# Patient Record
Sex: FEMALE | Race: WHITE | NOT HISPANIC OR LATINO | Employment: FULL TIME | URBAN - METROPOLITAN AREA
[De-identification: names, ages, dates, MRNs, and addresses within clinical notes are randomized per-mention and may not be internally consistent; named-entity substitution may affect disease eponyms.]

---

## 2017-01-16 ENCOUNTER — OFFICE VISIT (OUTPATIENT)
Dept: OBSTETRICS AND GYNECOLOGY | Facility: CLINIC | Age: 62
End: 2017-01-16

## 2017-01-16 VITALS
DIASTOLIC BLOOD PRESSURE: 64 MMHG | HEIGHT: 56 IN | BODY MASS INDEX: 36.94 KG/M2 | SYSTOLIC BLOOD PRESSURE: 122 MMHG | WEIGHT: 164.2 LBS

## 2017-01-16 DIAGNOSIS — Z12.4 SCREENING FOR MALIGNANT NEOPLASM OF CERVIX: ICD-10-CM

## 2017-01-16 DIAGNOSIS — Z13.9 SCREENING: ICD-10-CM

## 2017-01-16 DIAGNOSIS — Z01.419 ENCOUNTER FOR GYNECOLOGICAL EXAMINATION WITHOUT ABNORMAL FINDING: Primary | ICD-10-CM

## 2017-01-16 DIAGNOSIS — N95.2 POSTMENOPAUSAL ATROPHIC VAGINITIS: ICD-10-CM

## 2017-01-16 DIAGNOSIS — D25.9 UTERINE LEIOMYOMA, UNSPECIFIED LOCATION: ICD-10-CM

## 2017-01-16 LAB
BILIRUB BLD-MCNC: NEGATIVE MG/DL
CLARITY, POC: CLEAR
COLOR UR: YELLOW
GLUCOSE UR STRIP-MCNC: NEGATIVE MG/DL
KETONES UR QL: ABNORMAL
LEUKOCYTE EST, POC: NEGATIVE
NITRITE UR-MCNC: NEGATIVE MG/ML
PH UR: 5 [PH] (ref 5–8)
PROT UR STRIP-MCNC: NEGATIVE MG/DL
RBC # UR STRIP: ABNORMAL /UL
SP GR UR: 1.03 (ref 1–1.03)
UROBILINOGEN UR QL: NORMAL

## 2017-01-16 PROCEDURE — 99386 PREV VISIT NEW AGE 40-64: CPT | Performed by: OBSTETRICS & GYNECOLOGY

## 2017-01-16 PROCEDURE — 81002 URINALYSIS NONAUTO W/O SCOPE: CPT | Performed by: OBSTETRICS & GYNECOLOGY

## 2017-01-16 RX ORDER — METFORMIN HYDROCHLORIDE 500 MG/1
TABLET, EXTENDED RELEASE ORAL
Refills: 2 | COMMUNITY
Start: 2016-12-11

## 2017-01-16 RX ORDER — ANTIOX #8/OM3/DHA/EPA/LUT/ZEAX 250-2.5 MG
CAPSULE ORAL
COMMUNITY

## 2017-01-16 RX ORDER — RISEDRONATE SODIUM 150 MG/1
TABLET, FILM COATED ORAL
Qty: 3 TABLET | Refills: 4 | Status: SHIPPED | OUTPATIENT
Start: 2017-01-16 | End: 2018-01-12 | Stop reason: SDUPTHER

## 2017-01-16 NOTE — MR AVS SNAPSHOT
Trupti Davis   1/16/2017 1:15 PM   Office Visit    Dept Phone:  [phone]   Encounter #:  [id number]    Provider:  Angélica Weinstein MD   Department:  [facility] OB GYN                Your Full Care Plan              Today's Medication Changes          These changes are accurate as of: 1/16/17  2:28 PM.  If you have any questions, ask your nurse or doctor.               Medication(s)that have changed:     metFORMIN  MG 24 hr tablet   Commonly known as:  GLUCOPHAGE-XR   TK 1 T PO D WF FOR DIABETES   What changed:  Another medication with the same name was removed. Continue taking this medication, and follow the directions you see here.   Changed by:  Angélica Weinstein MD       risedronate 150 MG tablet   Commonly known as:  ACTONEL   TAKE 1 TABLET MONTHLY   What changed:  Another medication with the same name was removed. Continue taking this medication, and follow the directions you see here.   Changed by:  Bran Prado MD         Stop taking medication(s)listed here:     fexofenadine 180 MG tablet   Commonly known as:  ALLEGRA   Stopped by:  Angélica Weinstein MD                      Your Updated Medication List          This list is accurate as of: 1/16/17  2:28 PM.  Always use your most recent med list.                anastrozole 1 MG tablet   Commonly known as:  ARIMIDEX   Take 1 tablet by mouth daily.       aspirin 81 MG tablet       CITRACAL PO       hydrochlorothiazide 50 MG tablet   Commonly known as:  HYDRODIURIL       losartan 100 MG tablet   Commonly known as:  COZAAR       lovastatin 20 MG tablet   Commonly known as:  MEVACOR       meloxicam 15 MG tablet   Commonly known as:  MOBIC       metFORMIN  MG 24 hr tablet   Commonly known as:  GLUCOPHAGE-XR       * multivitamin-iron-minerals-folic acid chewable tablet       * PRESERVISION AREDS 2 capsule       risedronate 150 MG tablet   Commonly known as:  ACTONEL      TAKE 1 TABLET MONTHLY       * Notice:  This list has 2 medication(s) that are the same as other medications prescribed for you. Read the directions carefully, and ask your doctor or other care provider to review them with you.            We Performed the Following     IgP, Aptima HPV     POC Urinalysis Dipstick       You Were Diagnosed With        Codes Comments    Encounter for gynecological examination without abnormal finding    -  Primary ICD-10-CM: Z01.419  ICD-9-CM: V72.31     Postmenopausal atrophic vaginitis     ICD-10-CM: N95.2  ICD-9-CM: 627.3     Uterine leiomyoma, unspecified location     ICD-10-CM: D25.9  ICD-9-CM: 218.9     Screening     ICD-10-CM: Z13.9  ICD-9-CM: V82.9     Screening for malignant neoplasm of cervix     ICD-10-CM: Z12.4  ICD-9-CM: V76.2       Instructions     None    Patient Instructions History      Upcoming Appointments     Visit Type Date Time Department    NEW PATIENT 1/16/2017  1:15 PM [facility] BONE DENSITY AXIAL 1/31/2017  2:30 PM [facility] DEXA UCE    FOLLOW UP 1 UNIT 3/20/2017  1:00 PM [facility] [place]    LAB 3/20/2017 12:20 PM [facility] ONC CBC LAB [facility]      MyChart Signup     Our records indicate that you have an active [facility] account.    You can view your After Visit Summary by going to VFA and logging in with your [employer] username and password.  If you don't have a [employer] username and password but a parent or guardian has access to your record, the parent or guardian should login with their own [employer] username and password and access your record to view the After Visit Summary.    If you have questions, you can email [email or web address] or call [phone] to talk to our [employer] staff.  Remember, [employer] is NOT to be used for urgent needs.  For medical emergencies, dial 911.               Other Info from Your Visit           Your Appointments     Jan 31, 2017  2:30 PM EST   DEXA BONE DENSITY AXIAL with  "[facility] UCE DEXA 1   [facility] [place] BONE DENSITY ([place])    [address]  [facility] [address] [phone]           Bring current list of medications  Do not wear zippers and buttons  Bring outside films/report  Hold calcium for 24 hrs prior to procedure.            Mar 20, 2017 12:20 PM EDT   Lab with LAB CHAIR 2 [facility] ONC LAB (--)    [address]  [address]  [facility] [address] [phone]            Mar 20, 2017  1:00 PM EDT   FOLLOW UP with Bran Prado MD   [facility]LOOD DISORDERS [facility] CANCER ([facility])    [address]  [place] [address] [place] [id number] [phone]              Allergies     Augmentin [Amoxicillin-pot Clavulanate]      Cefdinir      Compazine [Prochlorperazine Edisylate]        Reason for Visit     Establish Care new pt    Gynecologic Exam last pap 4/2015, last mammo 9/2016 @ [facility], bone density 1/16 osteopenia, colonoscopy 1/2016      Vital Signs     Blood Pressure Height Weight Body Mass Index Smoking Status       122/64 56\" (142.2 cm) 164 lb 3.2 oz (74.5 kg) 36.81 kg/m2 Never Smoker       Problems and Diagnoses Noted     Encounter for routine gynecological examination    -  Primary    Postmenopausal vaginal dryness        Uterine fibroid        Screening        Screening for cervical cancer          Results     POC Urinalysis Dipstick      Component Value Standard Range & Units    Color Yellow Yellow, Straw, Dark Yellow, Eli    Clarity, UA Clear Clear    Glucose, UA Negative Negative, 1000 mg/dL (3+) mg/dL    Bilirubin Negative Negative    Ketones, UA Trace Negative    Specific Gravity  1.030 1.005 - 1.030    Blood, UA Trace Negative    pH, Urine 5.0 5.0 - 8.0    Protein, POC Negative Negative mg/dL    Urobilinogen, UA Normal Normal    Leukocytes Negative Negative    Nitrite, UA Negative Negative                    "

## 2017-01-16 NOTE — PROGRESS NOTES
[facility] Obstetrics and Gynecology    GYN ANNUAL EXAM:  Chief Complaint   Patient presents with   • Establish Care     new pt   • Gynecologic Exam     last pap 2015, last mammo 2016 @ [facility], bone density  osteopenia, colonoscopy 2016       Subjective   History of Present Illness    Trupti Davis is a 61 y.o. female who presents for annual exam.  Trupti has a personal history of left breast cancer in  she is status post chemotherapy and radiation and is currently on maintenance oral chemotherapy, Arimedex.  Recent mammogram in 2016 was benign.  She has a history of uterine fibroids.  She's had no postmenopausal bleeding.  She did not use postmenopausal hormone replacement therapy.  She has noted that her left breast is somewhat smaller than her right breast since her radiation therapy.  Her breasts are large and she had questions about breast reduction of the right breast.  We briefly discussed the surgery and postoperative course and in the difficulty she might have.    Obstetric History:  OB History      Para Term  AB TAB SAB Ectopic Multiple Living    3 2 2  1  1            Menstrual History:     No LMP recorded. Patient is postmenopausal.       Sexual History: NA         Family history of breast cancer: yes - P aunt  Family history of ovarian cancer: no  Family history of uterine cancer: no  Family History of cervical cancer: no  Family History of colon cancer/colon polyps: no  Regular self breast exam: yes  Current contraception:POST MENOPAUSAL  History of abnormal Pap smear: no  Received Gardasil immunization: N/A  LYNNETTE exposure in utero: no  History of abnormal mammogram: yes - Left breast cancer     The following portions of the patient's history were reviewed and updated as appropriate: allergies, current medications, past family history, past medical history, past social history, past surgical history and problem list.    Review of  "Systems    Pertinent items are noted in HPI.       Objective   Physical Exam    Visit Vitals   • /64   • Ht 56\" (142.2 cm)   • Wt 164 lb 3.2 oz (74.5 kg)   • BMI 36.81 kg/m2       General:   alert, appears stated age and cooperative   Heart: regular rate and rhythm, S1, S2 normal, no murmur, click, rub or gallop   Lungs: clear to auscultation bilaterally   Abdomen: soft, non-tender, without masses or organomegaly   Breast: inspection negative, no nipple discharge or bleeding, no masses or nodularity palpable and scar left breast   Vulva: Bartholin's, Urethra, [place]'s normal   Vagina: atrophic mucosa   Cervix: no bleeding following Pap, no cervical motion tenderness and no lesions   Uterus: midline, non-tender, mobile, Midplane, 8 weeks size irregular contour   Adnexa: normal adnexa and no mass, fullness, tenderness     Assessment/Plan   Trupti was seen today for establish care and gynecologic exam.    Diagnoses and all orders for this visit:    Encounter for gynecological examination without abnormal finding    Postmenopausal atrophic vaginitis    Uterine leiomyoma, unspecified location    Screening  -     POC Urinalysis Dipstick    Screening for malignant neoplasm of cervix  -     IgP, Aptima HPV        Thin prep Pap smear.  All questions answered.  Await pap smear results.  Follow up in 1 year.               Angélica Weinstein MD,  FACOG  "

## 2017-01-19 LAB
CYTOLOGIST CVX/VAG CYTO: NORMAL
CYTOLOGY CVX/VAG DOC THIN PREP: NORMAL
DX ICD CODE: NORMAL
HIV 1 & 2 AB SER-IMP: NORMAL
HPV I/H RISK 4 DNA CVX QL PROBE+SIG AMP: NEGATIVE
OTHER STN SPEC: NORMAL
PATH REPORT.FINAL DX SPEC: NORMAL
STAT OF ADQ CVX/VAG CYTO-IMP: NORMAL

## 2017-01-24 ENCOUNTER — TELEPHONE (OUTPATIENT)
Dept: OBSTETRICS AND GYNECOLOGY | Facility: CLINIC | Age: 62
End: 2017-01-24

## 2017-01-24 NOTE — TELEPHONE ENCOUNTER
SPOKE WITH JUSTIN AND TOLD HER PER DR FRANKLIN HER PAP SMEAR CAME BACK NEGATIVE. PT EXPRESSED UNDERSTANDING

## 2017-01-24 NOTE — TELEPHONE ENCOUNTER
----- Message from Angélica Weinstein MD sent at 1/23/2017  5:18 PM EST -----  Congratulations your pap testing is normal      Thank you  Angélica Weinstein MD

## 2017-01-31 ENCOUNTER — HOSPITAL ENCOUNTER (OUTPATIENT)
Dept: BONE DENSITY | Facility: HOSPITAL | Age: 62
Discharge: HOME | End: 2017-01-31
Attending: INTERNAL MEDICINE | Admitting: INTERNAL MEDICINE

## 2017-01-31 DIAGNOSIS — C50.412 MALIGNANT NEOPLASM OF UPPER-OUTER QUADRANT OF LEFT FEMALE BREAST (HCC): ICD-10-CM

## 2017-01-31 PROCEDURE — 77080 DXA BONE DENSITY AXIAL: CPT

## 2017-03-17 DIAGNOSIS — C50.412 MALIGNANT NEOPLASM OF UPPER-OUTER QUADRANT OF LEFT FEMALE BREAST (HCC): Primary | ICD-10-CM

## 2017-03-20 ENCOUNTER — LAB (OUTPATIENT)
Dept: OTHER | Facility: HOSPITAL | Age: 62
End: 2017-03-20

## 2017-03-20 ENCOUNTER — OFFICE VISIT (OUTPATIENT)
Dept: ONCOLOGY | Facility: CLINIC | Age: 62
End: 2017-03-20

## 2017-03-20 VITALS
BODY MASS INDEX: 36.67 KG/M2 | HEIGHT: 56 IN | SYSTOLIC BLOOD PRESSURE: 132 MMHG | DIASTOLIC BLOOD PRESSURE: 77 MMHG | RESPIRATION RATE: 14 BRPM | TEMPERATURE: 98.6 F | WEIGHT: 163 LBS | OXYGEN SATURATION: 96 % | HEART RATE: 63 BPM

## 2017-03-20 DIAGNOSIS — C50.412 MALIGNANT NEOPLASM OF UPPER-OUTER QUADRANT OF LEFT FEMALE BREAST (HCC): Primary | ICD-10-CM

## 2017-03-20 DIAGNOSIS — C50.412 MALIGNANT NEOPLASM OF UPPER-OUTER QUADRANT OF LEFT FEMALE BREAST (HCC): ICD-10-CM

## 2017-03-20 DIAGNOSIS — M85.80 OSTEOPENIA: ICD-10-CM

## 2017-03-20 LAB
ALBUMIN SERPL-MCNC: 4.6 G/DL (ref 3.5–5.2)
ALBUMIN/GLOB SERPL: 1.8 G/DL
ALP SERPL-CCNC: 53 U/L (ref 39–117)
ALT SERPL W P-5'-P-CCNC: 23 U/L (ref 1–33)
ANION GAP SERPL CALCULATED.3IONS-SCNC: 14.3 MMOL/L
AST SERPL-CCNC: 21 U/L (ref 1–32)
BASOPHILS # BLD AUTO: 0.05 10*3/MM3 (ref 0–0.2)
BASOPHILS NFR BLD AUTO: 1.1 % (ref 0–1.5)
BILIRUB SERPL-MCNC: 0.4 MG/DL (ref 0.1–1.2)
BUN BLD-MCNC: 19 MG/DL (ref 8–23)
BUN/CREAT SERPL: 26.8 (ref 7–25)
CALCIUM SPEC-SCNC: 9.5 MG/DL (ref 8.6–10.5)
CHLORIDE SERPL-SCNC: 100 MMOL/L (ref 98–107)
CO2 SERPL-SCNC: 28.7 MMOL/L (ref 22–29)
CREAT BLD-MCNC: 0.71 MG/DL (ref 0.57–1)
DEPRECATED RDW RBC AUTO: 40.6 FL (ref 37–54)
EOSINOPHIL # BLD AUTO: 0.13 10*3/MM3 (ref 0–0.7)
EOSINOPHIL NFR BLD AUTO: 2.9 % (ref 0.3–6.2)
ERYTHROCYTE [DISTWIDTH] IN BLOOD BY AUTOMATED COUNT: 12.8 % (ref 11.7–13)
GFR SERPL CREATININE-BSD FRML MDRD: 83 ML/MIN/1.73
GLOBULIN UR ELPH-MCNC: 2.5 GM/DL
GLUCOSE BLD-MCNC: 102 MG/DL (ref 65–99)
HCT VFR BLD AUTO: 38.6 % (ref 35.6–45.5)
HGB BLD-MCNC: 13.3 G/DL (ref 11.9–15.5)
HOLD SPECIMEN: NORMAL
IMM GRANULOCYTES # BLD: 0.01 10*3/MM3 (ref 0–0.03)
IMM GRANULOCYTES NFR BLD: 0.2 % (ref 0–0.5)
LYMPHOCYTES # BLD AUTO: 1.42 10*3/MM3 (ref 0.9–4.8)
LYMPHOCYTES NFR BLD AUTO: 32.1 % (ref 19.6–45.3)
MCH RBC QN AUTO: 30.1 PG (ref 26.9–32)
MCHC RBC AUTO-ENTMCNC: 34.5 G/DL (ref 32.4–36.3)
MCV RBC AUTO: 87.3 FL (ref 80.5–98.2)
MONOCYTES # BLD AUTO: 0.46 10*3/MM3 (ref 0.2–1.2)
MONOCYTES NFR BLD AUTO: 10.4 % (ref 5–12)
NEUTROPHILS # BLD AUTO: 2.36 10*3/MM3 (ref 1.9–8.1)
NEUTROPHILS NFR BLD AUTO: 53.3 % (ref 42.7–76)
NRBC BLD MANUAL-RTO: 0 /100 WBC (ref 0–0)
PLATELET # BLD AUTO: 223 10*3/MM3 (ref 140–500)
PMV BLD AUTO: 9.4 FL (ref 6–12)
POTASSIUM BLD-SCNC: 3.7 MMOL/L (ref 3.5–5.2)
PROT SERPL-MCNC: 7.1 G/DL (ref 6–8.5)
RBC # BLD AUTO: 4.42 10*6/MM3 (ref 3.9–5.2)
SODIUM BLD-SCNC: 143 MMOL/L (ref 136–145)
WBC NRBC COR # BLD: 4.43 10*3/MM3 (ref 4.5–10.7)

## 2017-03-20 PROCEDURE — 99214 OFFICE O/P EST MOD 30 MIN: CPT | Performed by: INTERNAL MEDICINE

## 2017-03-20 PROCEDURE — 85025 COMPLETE CBC W/AUTO DIFF WBC: CPT | Performed by: INTERNAL MEDICINE

## 2017-03-20 PROCEDURE — 36415 COLL VENOUS BLD VENIPUNCTURE: CPT

## 2017-03-20 PROCEDURE — 80053 COMPREHEN METABOLIC PANEL: CPT | Performed by: INTERNAL MEDICINE
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
## 2017-03-21 DIAGNOSIS — C50.412 MALIGNANT NEOPLASM OF UPPER-OUTER QUADRANT OF LEFT FEMALE BREAST (HCC): Primary | ICD-10-CM

## 2017-03-21 DIAGNOSIS — M85.80 OSTEOPENIA: ICD-10-CM

## 2017-04-12 ENCOUNTER — TRANSCRIBE ORDERS (OUTPATIENT)
Dept: ADMINISTRATIVE | Facility: HOSPITAL | Age: 62
End: 2017-04-12

## 2017-04-12 DIAGNOSIS — C50.412 MALIGNANT NEOPLASM OF UPPER-OUTER QUADRANT OF LEFT FEMALE BREAST (HCC): Primary | ICD-10-CM

## 2017-05-15 RX ORDER — ANASTROZOLE 1 MG/1
TABLET ORAL
Qty: 90 TABLET | Refills: 3 | Status: SHIPPED | OUTPATIENT
Start: 2017-05-15 | End: 2018-04-19 | Stop reason: SDUPTHER

## 2017-09-15 ENCOUNTER — HOSPITAL ENCOUNTER (OUTPATIENT)
Dept: MAMMOGRAPHY | Facility: HOSPITAL | Age: 62
Discharge: HOME | End: 2017-09-15
Attending: INTERNAL MEDICINE | Admitting: INTERNAL MEDICINE

## 2017-09-15 DIAGNOSIS — C50.412 MALIGNANT NEOPLASM OF UPPER-OUTER QUADRANT OF LEFT FEMALE BREAST (HCC): ICD-10-CM

## 2017-09-15 PROCEDURE — 77063 BREAST TOMOSYNTHESIS BI: CPT

## 2017-09-15 PROCEDURE — G0202 SCR MAMMO BI INCL CAD: HCPCS

## 2017-09-18 ENCOUNTER — LAB (OUTPATIENT)
Dept: OTHER | Facility: HOSPITAL | Age: 62
End: 2017-09-18

## 2017-09-18 ENCOUNTER — OFFICE VISIT (OUTPATIENT)
Dept: ONCOLOGY | Facility: CLINIC | Age: 62
End: 2017-09-18

## 2017-09-18 VITALS
RESPIRATION RATE: 18 BRPM | OXYGEN SATURATION: 98 % | BODY MASS INDEX: 36.22 KG/M2 | WEIGHT: 161 LBS | SYSTOLIC BLOOD PRESSURE: 126 MMHG | HEART RATE: 65 BPM | TEMPERATURE: 98.1 F | HEIGHT: 56 IN | DIASTOLIC BLOOD PRESSURE: 77 MMHG

## 2017-09-18 DIAGNOSIS — C50.412 MALIGNANT NEOPLASM OF UPPER-OUTER QUADRANT OF LEFT FEMALE BREAST (HCC): ICD-10-CM

## 2017-09-18 DIAGNOSIS — M85.80 OSTEOPENIA: ICD-10-CM

## 2017-09-18 DIAGNOSIS — C50.412 MALIGNANT NEOPLASM OF UPPER-OUTER QUADRANT OF LEFT FEMALE BREAST (HCC): Primary | ICD-10-CM

## 2017-09-18 LAB
ALBUMIN SERPL-MCNC: 4.8 G/DL (ref 3.5–5.2)
ALBUMIN/GLOB SERPL: 1.9 G/DL
ALP SERPL-CCNC: 58 U/L (ref 39–117)
ALT SERPL W P-5'-P-CCNC: 27 U/L (ref 1–33)
ANION GAP SERPL CALCULATED.3IONS-SCNC: 13.7 MMOL/L
AST SERPL-CCNC: 23 U/L (ref 1–32)
BASOPHILS # BLD AUTO: 0.05 10*3/MM3 (ref 0–0.2)
BASOPHILS NFR BLD AUTO: 1 % (ref 0–1.5)
BILIRUB SERPL-MCNC: 0.3 MG/DL (ref 0.1–1.2)
BUN BLD-MCNC: 19 MG/DL (ref 8–23)
BUN/CREAT SERPL: 29.7 (ref 7–25)
CALCIUM SPEC-SCNC: 10.2 MG/DL (ref 8.6–10.5)
CHLORIDE SERPL-SCNC: 98 MMOL/L (ref 98–107)
CO2 SERPL-SCNC: 29.3 MMOL/L (ref 22–29)
CREAT BLD-MCNC: 0.64 MG/DL (ref 0.57–1)
DEPRECATED RDW RBC AUTO: 40.1 FL (ref 37–54)
EOSINOPHIL # BLD AUTO: 0.14 10*3/MM3 (ref 0–0.7)
EOSINOPHIL NFR BLD AUTO: 2.7 % (ref 0.3–6.2)
ERYTHROCYTE [DISTWIDTH] IN BLOOD BY AUTOMATED COUNT: 12.6 % (ref 11.7–13)
GFR SERPL CREATININE-BSD FRML MDRD: 94 ML/MIN/1.73
GLOBULIN UR ELPH-MCNC: 2.5 GM/DL
GLUCOSE BLD-MCNC: 109 MG/DL (ref 65–99)
HCT VFR BLD AUTO: 39.2 % (ref 35.6–45.5)
HGB BLD-MCNC: 13.6 G/DL (ref 11.9–15.5)
IMM GRANULOCYTES # BLD: 0.02 10*3/MM3 (ref 0–0.03)
IMM GRANULOCYTES NFR BLD: 0.4 % (ref 0–0.5)
LYMPHOCYTES # BLD AUTO: 1.31 10*3/MM3 (ref 0.9–4.8)
LYMPHOCYTES NFR BLD AUTO: 24.9 % (ref 19.6–45.3)
MCH RBC QN AUTO: 30.4 PG (ref 26.9–32)
MCHC RBC AUTO-ENTMCNC: 34.7 G/DL (ref 32.4–36.3)
MCV RBC AUTO: 87.5 FL (ref 80.5–98.2)
MONOCYTES # BLD AUTO: 0.47 10*3/MM3 (ref 0.2–1.2)
MONOCYTES NFR BLD AUTO: 8.9 % (ref 5–12)
NEUTROPHILS # BLD AUTO: 3.27 10*3/MM3 (ref 1.9–8.1)
NEUTROPHILS NFR BLD AUTO: 62.1 % (ref 42.7–76)
NRBC BLD MANUAL-RTO: 0 /100 WBC (ref 0–0)
PLATELET # BLD AUTO: 243 10*3/MM3 (ref 140–500)
PMV BLD AUTO: 9.7 FL (ref 6–12)
POTASSIUM BLD-SCNC: 4.4 MMOL/L (ref 3.5–5.2)
PROT SERPL-MCNC: 7.3 G/DL (ref 6–8.5)
RBC # BLD AUTO: 4.48 10*6/MM3 (ref 3.9–5.2)
SODIUM BLD-SCNC: 141 MMOL/L (ref 136–145)
WBC NRBC COR # BLD: 5.26 10*3/MM3 (ref 4.5–10.7)

## 2017-09-18 PROCEDURE — 99213 OFFICE O/P EST LOW 20 MIN: CPT | Performed by: INTERNAL MEDICINE

## 2017-09-18 PROCEDURE — 80053 COMPREHEN METABOLIC PANEL: CPT | Performed by: INTERNAL MEDICINE

## 2017-09-18 PROCEDURE — 36415 COLL VENOUS BLD VENIPUNCTURE: CPT

## 2017-09-18 PROCEDURE — 85025 COMPLETE CBC W/AUTO DIFF WBC: CPT | Performed by: INTERNAL MEDICINE

## 2017-09-18 RX ORDER — PHENAZOPYRIDINE HYDROCHLORIDE 100 MG/1
TABLET, FILM COATED ORAL
Refills: 0 | COMMUNITY
Start: 2017-09-06 | End: 2017-09-18

## 2017-09-18 RX ORDER — NITROFURANTOIN 25; 75 MG/1; MG/1
CAPSULE ORAL
Refills: 0 | COMMUNITY
Start: 2017-09-06 | End: 2017-09-18

## 2017-09-18 NOTE — PROGRESS NOTES
Subjective      REASONS FOR FOLLOWUP:   1. T1cN0, ER positive, IL negative, HER/2 negative breast cancer, high intermediate OncoType score(29), receiving Taxotere/Cytoxan x4 followed by aromatase inhibitor.   2. Followup visit for right arm (antecubital area, Adriamycin/Cytoxan) infiltration after receiving Taxotere on 02/09/2012.   3. Arimidex started on 04/09/2012.   4. Carpal tunnel syndrome.   5. Persistent mass in right breast at 10 o'clock - evaluation by Dr. Fitzgerald planned.   6. Persistent mass of right breast at 10 o'clock. Dr. Fitzgerald feels this is benign.   7. Worsening bone density, Actonel resumed in 03/2015.       History of Present Illness  patient is 60-year-old lady with a T1 CN 0 ER/IL positive left breast cancer diagnosed in 2012 and treated with 4 cycles of Cytoxan and Taxotere followed by radiation and Arimidex now for 5 years.  She comes in for routine follow-up and is doing very well overall.  Because of her high risk Oncotype we have decided to continue her therapy for 10 years with Arimidex and her bone density is stable with actual improvement in the lumbar spine in January of this year.  She herself is also keen on continuing treatment at this point and is not having undue side effects.  She remains on Actonel    PAST MEDICAL HISTORY: Significant for mild hypertension, hypercholesterolemia, degenerative arthritis in her neck, and carpal tunnel. She has no diabetes, no kidney problems, no ulcers, no heart attacks or strokes, no DVT, no asthma, no emphysema, no prior problems with hepat itis or anemia.     PAST SURGICAL HISTORY: D and C 1978, 2 childbirths, gallbladder surgery 2007, toenails removed. Previous biopsy on left breast for an abnormality on mammography in 2008 which showed no abnormalities and non-atypical ductal hyperplasia.     OB/GYN HISTORY: G2, P2, AB1 (miscarriage). Menarche age 9. Menopause age 51. She has not been on any hormonal replacement. Age 23 with her first  childbirth and did not breast feed.      ONCOLOGIC HISTORY: The patient was noted on routine mammography this year to have a change in the left breast which was not seen in 2011. The findings revealed a 9-10 mm abnormality in the left breast. Ultrasound showed this to be hypoechoic and slight l y irregular, measuring 1.3 cm in greatest dimension. A new biopsy was done on 10/24/11 which showed an invasive ductal carcinoma focally with lobular features, well-differentiated grade 1, ER positive, CA negative, Her-2 negative. The patient then was r e ferred to Dr. Jose R Crystal and had an MRI of both breasts. The right was normal. The left breast showed post biopsy hematoma cavity. Along with the cavity there was a nodular irregular focus of enhancement measuring 1.2 x 0.9 cm. The patient then under w ent lumpectomy sentinel biopsy on 11/21/11 with the findings of a residual 1 cm invasive ductal carcinoma grade 1 out of 3, with no lobular features noted. Margins were widely clear and 2 sentinel nodes were negative for metastasis. The patient is refer red her for decision about further adjuvant treatment.   OncoType DX shows a score of 29 which gives her 20% chance of distant recurrence of 10 years. We discussed options and I told her based on the fact that the Nayla-RX study had actually used a cutof f of 25 for intermediate risk, I felt that she would definitely qualify for some additional adjuvant treatment in the form of chemotherapy with four doses of Cytoxan/Taxotere and she is agreeable. She has had chemo education and we are going to try to do this without a port and she wants to start on Thursday so she can get done before March when she has a cruise planned. She did have a cardiac echo done which showed EF of 55-60% and no other abnormalities.   Patient seen 2/9/12 for cycle 3 of Cytoxan/Taxotere with Neulasta support doing well. Plans to go to [place] for one week after her third cycle before her radiation.    The patient was seen on 12 for her last dose of chemo. Her extravasation site is much improved. We plan to get a PICC line p laced above the extravasation site for her last chemo and pull it after treatment, and she is planning to go to [place] in about 2 weeks and we will check her blood counts before she leaves.   The patient returned from [place] in late March, started radiation in early April and Arimidex to start 4/10/12. Bone density will be checked again in the summer of .   The patient was seen on 10/22/12 doing well. Her hands are bothering her and she is scheduled to have carpal tunnel surgery.   The patient was seen 13 doing well. Carpal tunnels were injected with good relief. Due for followup mammogram in April.   Bilateral carpal tunnel surgery done to  with good results. Arimidex continued. Mammogram 2013 with ultrasound negative.   SOCIAL HISTORY: . Works as an . Does not smoke. Rare alcohol. No drug history or risk factors for HIV.     FAMILY HISTORY: Father  at 85 of congestive heart failure. Mother is alive at 83. No siblings. Two sons, who are healthy.   at age 38 of ruptured aneurysm. No FH of breast, ovarian, or colon cancer.     Review of Systems   Musculoskeletal: Positive for arthralgias and back pain.      A comprehensive 14 point review of systems was performed and was negative except as mentioned.    Current Outpatient Prescriptions on File Prior to Visit   Medication Sig Dispense Refill   • anastrozole (ARIMIDEX) 1 MG tablet TAKE 1 TABLET DAILY 90 tablet 3   • aspirin 81 MG tablet Take 81 mg by mouth daily.     • Calcium Citrate (CITRACAL PO) Take  by mouth.     • hydrochlorothiazide (HYDRODIURIL) 50 MG tablet Take 50 mg by mouth daily.     • losartan (COZAAR) 100 MG tablet Take 100 mg by mouth daily.     • lovastatin (MEVACOR) 20 MG tablet Take 20 mg by mouth every night.     • meloxicam (MOBIC) 15 MG tablet Take 15 mg by  "mouth As Needed.     • metFORMIN XR (GLUCOPHAGE-XR) 500 MG 24 hr tablet TK 1 T PO D WF FOR DIABETES  2   • Multiple Vitamins-Minerals (PRESERVISION AREDS 2) capsule Take  by mouth.     • multivitamin-iron-minerals-folic acid (CENTRUM) chewable tablet Chew 1 tablet daily.     • risedronate (ACTONEL) 150 MG tablet TAKE 1 TABLET MONTHLY 3 tablet 4     No current facility-administered medications on file prior to visit.          ALLERGIES:    Allergies   Allergen Reactions   • Augmentin [Amoxicillin-Pot Clavulanate]    • Cefdinir    • Compazine [Prochlorperazine Edisylate]        Objective      Vitals:    09/18/17 1302   BP: 126/77   Pulse: 65   Resp: 18   Temp: 98.1 °F (36.7 °C)   TempSrc: Oral   SpO2: 98%   Weight: 161 lb (73 kg)   Height: 55.91\" (142 cm)  Comment: new ht   PainSc: 0-No pain     Current Status 9/18/2017   ECOG score 0       Physical Exam    GENERAL:  Well-developed, well-nourished in no acute distress.   SKIN:  Warm, dry without rashes, purpura or petechiae.  HEAD:  Normocephalic.  EYES:  Pupils equal, round and reactive to light.  EOMs intact.  Conjunctivae normal.  EARS:  Hearing intact.  NOSE:  Septum midline.  No excoriations or nasal discharge.  MOUTH:  Tongue is well-papillated; no stomatitis or ulcers.  Lips normal.  THROAT:  Oropharynx without lesions or exudates.  NECK:  Supple with good range of motion; no thyromegaly or masses, no JVD.  LYMPHATICS:  No cervical, supraclavicular, axillary or inguinal adenopathy.  CHEST:  Lungs clear to percussion and auscultation. Good airflow.  BREASTS: Right breast shows the persistent nodularity the 10 o'clock position which is stable                     Left breast is benign with a lumpectomy scar in the upper inner quadrant  CARDIAC:  Regular rate and rhythm without murmurs, rubs or gallops. Normal S1,S2.  ABDOMEN:  Soft, nontender with no organomegaly or masses.  EXTREMITIES:  No clubbing, cyanosis or edema.  NEUROLOGICAL:  Cranial Nerves II-XII " grossly intact.  No focal neurological deficits.  PSYCHIATRIC:  Normal affect and mood.  No percussion tenderness over the spine      RECENT LABS:  Hematology WBC   Date Value Ref Range Status   09/18/2017 5.26 4.50 - 10.70 10*3/mm3 Final     RBC   Date Value Ref Range Status   09/18/2017 4.48 3.90 - 5.20 10*6/mm3 Final     Hemoglobin   Date Value Ref Range Status   09/18/2017 13.6 11.9 - 15.5 g/dL Final     Hematocrit   Date Value Ref Range Status   09/18/2017 39.2 35.6 - 45.5 % Final     Platelets   Date Value Ref Range Status   09/18/2017 243 140 - 500 10*3/mm3 Final          FINDINGS: Average lumbar bone mineral density 0.87 g/cm2 correlating to  a T value of -1.6 and a Z value of 0.      Left femoral neck bone mineral density 0.75 g/cm2 correlating to a T  value of -0.9 and a Z score of 0.5. Right femoral neck bone mineral  density 0.74 g/cm2 correlating to a T value -1.0 and a Z value of 0.4.      CONCLUSION: Osteopenia, increased risk for fracture.  Better than 2016      This report was finalized on 2/1/2017     Assessment/Plan   1.T1 CN 0 ER/MN positive left breast cancer on Arimidex for 5 years with good tolerance Oncotype score of 29--Arimidex continued beyond 5 years  2.  Mild of moderate osteopenia improving in the spine on Actonel  3.  Right breast nodularity stable  4.  Low back pain for 4 months questionable etiology-negative bone scan symptoms resolved as of 3/17    Plan   1. return in 1 year for follow-up and continue Arimidex for now              9/18/2017      CC:

## 2018-01-12 RX ORDER — RISEDRONATE SODIUM 150 MG/1
TABLET, FILM COATED ORAL
Qty: 3 TABLET | Refills: 3 | Status: SHIPPED | OUTPATIENT
Start: 2018-01-12 | End: 2019-11-25 | Stop reason: SDUPTHER

## 2018-04-19 RX ORDER — ANASTROZOLE 1 MG/1
1 TABLET ORAL DAILY
Qty: 90 TABLET | Refills: 3 | Status: SHIPPED | OUTPATIENT
Start: 2018-04-19 | End: 2018-05-02 | Stop reason: SDUPTHER

## 2018-05-02 RX ORDER — ANASTROZOLE 1 MG/1
1 TABLET ORAL DAILY
Qty: 90 TABLET | Refills: 3 | Status: SHIPPED | OUTPATIENT
Start: 2018-05-02 | End: 2019-03-21 | Stop reason: SDUPTHER

## 2018-05-02 NOTE — TELEPHONE ENCOUNTER
[facility], should be sent to [facility]. New prescription sent to [facility]

## 2018-08-10 DIAGNOSIS — Z17.0 MALIGNANT NEOPLASM OF UPPER-OUTER QUADRANT OF LEFT BREAST IN FEMALE, ESTROGEN RECEPTOR POSITIVE (HCC): Primary | ICD-10-CM

## 2018-08-10 DIAGNOSIS — C50.412 MALIGNANT NEOPLASM OF UPPER-OUTER QUADRANT OF LEFT BREAST IN FEMALE, ESTROGEN RECEPTOR POSITIVE (HCC): Primary | ICD-10-CM

## 2018-08-10 NOTE — PROGRESS NOTES
Continue Coumadin at the present dose.      What to Know When Taking Warfarin  Warfarin (brand name Coumadin) is medicine that controls how your blood clots. It is used to help prevent blood clots that may cause serious health problems. These problems include heart attack (myocardial infarction), stroke, a blockage in an artery or vein (thrombus), or a blood clot that travels to the lungs (pulmonary embolism).    Before you start taking this medicine, be sure your doctor knows if you have any of these conditions:  · Stomach ulcer now or in the past  · Vomited blood or had bloody stools (black or red color)  · Aneurysm, pericarditis, or pericardial effusion  · Blood disorder  · Recent surgery, stroke, mini-stroke, or spinal puncture  · Kidney or liver disease, uncontrolled high blood pressure, diabetes, vasculitis, heart failure, lupus or other collagen-vascular disease, or high cholesterol  · You are pregnant or breastfeeding  · You are younger than 18 years old  · Recent or planned dental procedure  Although warfarin reduces the risk for blood clots, it increases your risk of bleeding. Because of this, you must take the medicine exactly as you are told by your healthcare provider.   You will have blood tests often to check the level of warfarin in your blood. It is important to have just the right amount to balance preventing blood clots and causing excessive bleeding. If the level is too low, you are at risk for developing a blood clot. If it's too high, you are at risk for bleeding. Make sure you keep all scheduled appointments for blood testing. If you dont, you will be at risk for bleeding problems. You also need to protect yourself from injury that may cause bleeding such as a fall or hitting your head.  Follow these tips  · Take this medicine at the same time each day. Take it with a full glass of water, with or without food.  · Make sure you know what to do if you miss a dose. If you are not sure what to  Per message from Eli @ appointment desk per verbal order Dr Prado placed order for yearly mammogram   do, call your healthcare provider or pharmacist. Do not take more warfarin than you were told to.  · Be sure to tell all of your providers including your dentist that you take warfarin. If you will be taking warfarin for quite a while, carry an ID card or get a Medic-Alert bracelet. This will alert medical staff in case you arent able to do so yourself. Also carry with you the name and number of the person to contact in case of an emergency.  · Keep your appointments for regular blood tests to measure the effects of warfarin. Your healthcare provider may need to change your dose based on the results of your blood test. Follow your doctors advice exactly about how to take this medicine.  · Do not stop taking the medicine without talking with your doctor.     Monitoring your PT/INR blood levels  You will need to have a blood test called a PT/INR regularly. PT stands for pro thrombin. INR stands for international normalized ratio. The PT/INR blood test is done to make sure you are getting the right dose of this medicine. The PT/INR test tells your healthcare provider how your blood is clotting. Prothrombin time, commonly referred to as pro time or PT, measures the time it takes for blood to clot. International normalized ratio or INR is a way to compare results of the PT tests done at different labs.  ·  remember to tell your doctor as soon as you get your lab results.        1. Prevent injuries and bleeding:  ¨ Do not go barefoot. Always wear shoes.  ¨ Do not trim corns or calluses yourself.  ¨ Use an electric razor to shave instead of a manual one.  ¨ Use a soft-bristled toothbrush and waxed dental floss.  2. Some medicines can affect your blood clotting. Always talk with your healthcare provider before stopping, starting, or changing any prescription or over-the-counter (OTC) medicines. This includes herbal medicines and vitamins. Talk with your healthcare provider about the following medicines:  ¨ Some  antibiotics. This is critical because some common antibiotics can cause severe bleeding if you take them along with warfarin. These antibiotics include ciprofloxacin and trimethoprim-sulfamethoxazole.   ¨ Some heart medicines  ¨ Cimetidine  ¨ Aspirin or other anti-inflammatory drugs, such as ibuprofen, naproxen, ketoprofen, or other arthritis medicines  ¨ Some drugs for depression, cancer, HIV (protease inhibitors), diabetes, seizures, gout, high cholesterol, or thyroid replacement  ¨ Vitamins containing Vitamin K  ¨ Herbal products such as ginkgo, Q10, garlic, or Janet's wort  3. Avoid major changes in your diet. Consuming high amounts of foods containing vitamin K can reduce the effectiveness of your warfarin.      Keep your diet steady  Keep your diet pretty much the same each day. Thats because many foods contain vitamin K. Vitamin K helps your blood clot. So eating disproportionately high amounts of foods that contain vitamin K can affect the way warfarin works. You dont need to avoid foods that have vitamin K. But you do need to keep the amount of them you eat steady (about the same day to day). If you change your diet for any reason, such as for illness or to lose weight, be sure to tell your doctor.  · Examples of foods containing vitamin K are asparagus, avocado, broccoli, cabbage, kale, spinach, and some other leafy green vegetables. Oils such as soybean, canola, and olive oils also contain vitamin K.  · Other food products can affect the way warfarin works in your body:  ¨ Food products that may affect blood clotting include cranberries and cranberry juice, fish oil supplements, garlic, momo, licorice, and turmeric.  ¨ Herbs used in herbal teas or supplements can also affect blood clotting. Keep the amount of herbal teas and supplements you use steady.  ¨ Alcohol can increase the effect of warfarin in your body.  Talk with your healthcare provider if you have concerns about these or other food  products and their effects on warfarin.  When to call your healthcare provider  Warfarin increases your risk of bleeding. Call your healthcare provider right away before you take your next dose of warfarin if you have any of these problems:  · Bleeding that doesnt stop in 10 minutes. This might be from a bloody nose or a cut, for example.  · A heavier-than-normal period or bleeding between periods  · Coughing or throwing up blood or something that looks like coffee grounds  · Nausea, bloating, or diarrhea  · Bleeding hemorrhoids  · Dark red or brown urine  · Red or black tarry stools  · Red or black-and-blue marks on the skin that get larger  · A fever or an illness that gets worse  · Dizziness, headache, weakness, or fatigue  · Chest pain or trouble breathing  · A serious fall or a blow to the head  · Swelling or pain after an injury or at an injection site  · Bleeding gums after brushing your teeth  What to watch for  If you have any of the following allergic reactions, call your doctor right away or go to the hospital.  · Rash  · Itching  · Swelling  · Trouble swallowing or breathing  [NOTE: This information topic may not include all directions, precautions, medical conditions, drug/food interactions, and warnings for this drug. Check with your doctor, nurse, or pharmacist for any questions that you may have.]  Date Last Reviewed: 6/1/2016  © 0429-8215 [employer]. [address] [place] [address], [place] [address]. All rights reserved. This information is not intended as a substitute for professional medical care. Always follow your healthcare professional's instructions.

## 2018-09-17 ENCOUNTER — HOSPITAL ENCOUNTER (OUTPATIENT)
Dept: MAMMOGRAPHY | Facility: HOSPITAL | Age: 63
Discharge: HOME | End: 2018-09-17
Attending: INTERNAL MEDICINE | Admitting: INTERNAL MEDICINE

## 2018-09-17 DIAGNOSIS — C50.412 MALIGNANT NEOPLASM OF UPPER-OUTER QUADRANT OF LEFT BREAST IN FEMALE, ESTROGEN RECEPTOR POSITIVE (HCC): ICD-10-CM

## 2018-09-17 DIAGNOSIS — Z17.0 MALIGNANT NEOPLASM OF UPPER-OUTER QUADRANT OF LEFT BREAST IN FEMALE, ESTROGEN RECEPTOR POSITIVE (HCC): ICD-10-CM

## 2018-09-17 PROCEDURE — 77067 SCR MAMMO BI INCL CAD: CPT

## 2018-09-17 PROCEDURE — 77063 BREAST TOMOSYNTHESIS BI: CPT

## 2018-10-08 ENCOUNTER — APPOINTMENT (OUTPATIENT)
Dept: OTHER | Facility: HOSPITAL | Age: 63
End: 2018-10-08

## 2018-10-08 ENCOUNTER — OFFICE VISIT (OUTPATIENT)
Dept: ONCOLOGY | Facility: CLINIC | Age: 63
End: 2018-10-08

## 2018-10-08 VITALS
DIASTOLIC BLOOD PRESSURE: 72 MMHG | OXYGEN SATURATION: 95 % | SYSTOLIC BLOOD PRESSURE: 131 MMHG | TEMPERATURE: 98.7 F | HEART RATE: 65 BPM | HEIGHT: 56 IN | BODY MASS INDEX: 34.42 KG/M2 | RESPIRATION RATE: 12 BRPM | WEIGHT: 153 LBS

## 2018-10-08 DIAGNOSIS — C50.412 MALIGNANT NEOPLASM OF UPPER-OUTER QUADRANT OF LEFT BREAST IN FEMALE, ESTROGEN RECEPTOR POSITIVE (HCC): ICD-10-CM

## 2018-10-08 DIAGNOSIS — Z17.0 MALIGNANT NEOPLASM OF UPPER-OUTER QUADRANT OF LEFT BREAST IN FEMALE, ESTROGEN RECEPTOR POSITIVE (HCC): ICD-10-CM

## 2018-10-08 DIAGNOSIS — M85.859 OSTEOPENIA OF HIP, UNSPECIFIED LATERALITY: Primary | ICD-10-CM

## 2018-10-08 DIAGNOSIS — C50.412 MALIGNANT NEOPLASM OF UPPER-OUTER QUADRANT OF LEFT FEMALE BREAST, UNSPECIFIED ESTROGEN RECEPTOR STATUS (HCC): Primary | ICD-10-CM

## 2018-10-08 LAB
ALBUMIN SERPL-MCNC: 4.6 G/DL (ref 3.5–5.2)
ALBUMIN/GLOB SERPL: 1.6 G/DL
ALP SERPL-CCNC: 53 U/L (ref 39–117)
ALT SERPL W P-5'-P-CCNC: 19 U/L (ref 1–33)
ANION GAP SERPL CALCULATED.3IONS-SCNC: 14 MMOL/L
AST SERPL-CCNC: 17 U/L (ref 1–32)
BASOPHILS # BLD AUTO: 0.05 10*3/MM3 (ref 0–0.2)
BASOPHILS NFR BLD AUTO: 1.2 % (ref 0–1.5)
BILIRUB SERPL-MCNC: 0.2 MG/DL (ref 0.1–1.2)
BUN BLD-MCNC: 22 MG/DL (ref 8–23)
BUN/CREAT SERPL: 34.4 (ref 7–25)
CALCIUM SPEC-SCNC: 9.8 MG/DL (ref 8.6–10.5)
CHLORIDE SERPL-SCNC: 100 MMOL/L (ref 98–107)
CO2 SERPL-SCNC: 28 MMOL/L (ref 22–29)
CREAT BLD-MCNC: 0.64 MG/DL (ref 0.57–1)
DEPRECATED RDW RBC AUTO: 41.2 FL (ref 37–54)
EOSINOPHIL # BLD AUTO: 0.14 10*3/MM3 (ref 0–0.7)
EOSINOPHIL NFR BLD AUTO: 3.3 % (ref 0.3–6.2)
ERYTHROCYTE [DISTWIDTH] IN BLOOD BY AUTOMATED COUNT: 12.8 % (ref 11.7–13)
GFR SERPL CREATININE-BSD FRML MDRD: 93 ML/MIN/1.73
GLOBULIN UR ELPH-MCNC: 2.8 GM/DL
GLUCOSE BLD-MCNC: 119 MG/DL (ref 65–99)
HCT VFR BLD AUTO: 38.7 % (ref 35.6–45.5)
HGB BLD-MCNC: 13.3 G/DL (ref 11.9–15.5)
IMM GRANULOCYTES # BLD: 0.01 10*3/MM3 (ref 0–0.03)
IMM GRANULOCYTES NFR BLD: 0.2 % (ref 0–0.5)
LYMPHOCYTES # BLD AUTO: 1.29 10*3/MM3 (ref 0.9–4.8)
LYMPHOCYTES NFR BLD AUTO: 30 % (ref 19.6–45.3)
MCH RBC QN AUTO: 30.3 PG (ref 26.9–32)
MCHC RBC AUTO-ENTMCNC: 34.4 G/DL (ref 32.4–36.3)
MCV RBC AUTO: 88.2 FL (ref 80.5–98.2)
MONOCYTES # BLD AUTO: 0.39 10*3/MM3 (ref 0.2–1.2)
MONOCYTES NFR BLD AUTO: 9.1 % (ref 5–12)
NEUTROPHILS # BLD AUTO: 2.42 10*3/MM3 (ref 1.9–8.1)
NEUTROPHILS NFR BLD AUTO: 56.2 % (ref 42.7–76)
NRBC BLD MANUAL-RTO: 0 /100 WBC (ref 0–0)
PLATELET # BLD AUTO: 224 10*3/MM3 (ref 140–500)
PMV BLD AUTO: 9.5 FL (ref 6–12)
POTASSIUM BLD-SCNC: 3.8 MMOL/L (ref 3.5–5.2)
PROT SERPL-MCNC: 7.4 G/DL (ref 6–8.5)
RBC # BLD AUTO: 4.39 10*6/MM3 (ref 3.9–5.2)
SODIUM BLD-SCNC: 142 MMOL/L (ref 136–145)
WBC NRBC COR # BLD: 4.3 10*3/MM3 (ref 4.5–10.7)

## 2018-10-08 PROCEDURE — 99213 OFFICE O/P EST LOW 20 MIN: CPT | Performed by: INTERNAL MEDICINE

## 2018-10-08 PROCEDURE — 36415 COLL VENOUS BLD VENIPUNCTURE: CPT

## 2018-10-08 PROCEDURE — 85025 COMPLETE CBC W/AUTO DIFF WBC: CPT | Performed by: INTERNAL MEDICINE

## 2018-10-08 PROCEDURE — 80053 COMPREHEN METABOLIC PANEL: CPT | Performed by: INTERNAL MEDICINE

## 2018-10-08 NOTE — PROGRESS NOTES
Subjective      REASONS FOR FOLLOWUP:   1. T1cN0, ER positive, WA negative, HER/2 negative breast cancer, high intermediate OncoType score(29), receiving Taxotere/Cytoxan x4 followed by aromatase inhibitor.   2. Followup visit for right arm (antecubital area, Cytoxan) infiltration after receiving Taxotere on 02/09/2012.   3. Arimidex started on 04/09/2012.   4. Carpal tunnel syndrome.   5. Persistent mass in right breast at 10 o'clock - evaluation by Dr. Fitzgerald planned.   6. Persistent mass of right breast at 10 o'clock. Dr. Fitzgerald feels this is benign.   7. Worsening bone density, Actonel resumed in 03/2015.       History of Present Illness    patient is 60-year-old lady with a T1 CN 0 ER/WA positive left breast cancer diagnosed in 2012 and treated with 4 cycles of Cytoxan and Taxotere followed by radiation and Arimidex now for 6 years.  She comes in for routine follow-up and is doing very well overall.  Because of her high risk Oncotype we have decided to continue her therapy for 10 years with Arimidex .  She remains on Actonel    She had a viral infection and was having abdominal pains and had a CT the abdomen that showed fibroids and her uterus and she is followed up closely gynecologist was imaged it at least 3 times and feels that it is stable -mammogram was normal 2 weeks ago        PAST MEDICAL HISTORY: Significant for mild hypertension, hypercholesterolemia, degenerative arthritis in her neck, and carpal tunnel. She has no diabetes, no kidney problems, no ulcers, no heart attacks or strokes, no DVT, no asthma, no emphysema, no prior problems with hepat itis or anemia.     PAST SURGICAL HISTORY: D and C 1978, 2 childbirths, gallbladder surgery 2007, toenails removed. Previous biopsy on left breast for an abnormality on mammography in 2008 which showed no abnormalities and non-atypical ductal hyperplasia.     OB/GYN HISTORY: G2, P2, AB1 (miscarriage). Menarche age 9. Menopause age 51. She has not been  on any hormonal replacement. Age 23 with her first childbirth and did not breast feed.      ONCOLOGIC HISTORY: The patient was noted on routine mammography this year to have a change in the left breast which was not seen in 2011. The findings revealed a 9-10 mm abnormality in the left breast. Ultrasound showed this to be hypoechoic and slight l y irregular, measuring 1.3 cm in greatest dimension. A new biopsy was done on 10/24/11 which showed an invasive ductal carcinoma focally with lobular features, well-differentiated grade 1, ER positive, UT negative, Her-2 negative. The patient then was r e ferred to Dr. Jose R Crystal and had an MRI of both breasts. The right was normal. The left breast showed post biopsy hematoma cavity. Along with the cavity there was a nodular irregular focus of enhancement measuring 1.2 x 0.9 cm. The patient then under w ent lumpectomy sentinel biopsy on 11/21/11 with the findings of a residual 1 cm invasive ductal carcinoma grade 1 out of 3, with no lobular features noted. Margins were widely clear and 2 sentinel nodes were negative for metastasis. The patient is refer red her for decision about further adjuvant treatment.   OncoType DX shows a score of 29 which gives her 20% chance of distant recurrence of 10 years. We discussed options and I told her based on the fact that the Nayla-RX study had actually used a cutof f of 25 for intermediate risk, I felt that she would definitely qualify for some additional adjuvant treatment in the form of chemotherapy with four doses of Cytoxan/Taxotere and she is agreeable. She has had chemo education and we are going to try to do this without a port and she wants to start on Thursday so she can get done before March when she has a cruise planned. She did have a cardiac echo done which showed EF of 55-60% and no other abnormalities.   Patient seen 2/9/12 for cycle 3 of Cytoxan/Taxotere with Neulasta support doing well. Plans to go to [place] for one  week after her third cycle before her radiation.   The patient was seen on 12 for her last dose of chemo. Her extravasation site is much improved. We plan to get a PICC line p laced above the extravasation site for her last chemo and pull it after treatment, and she is planning to go to [place] in about 2 weeks and we will check her blood counts before she leaves.   The patient returned from [place] in late March, started radiation in early April and Arimidex to start 4/10/12. Bone density will be checked again in the summer of .   The patient was seen on 10/22/12 doing well. Her hands are bothering her and she is scheduled to have carpal tunnel surgery.   The patient was seen 13 doing well. Carpal tunnels were injected with good relief. Due for followup mammogram in April.   Bilateral carpal tunnel surgery done to  with good results. Arimidex continued. Mammogram 2013 with ultrasound negative.   SOCIAL HISTORY: . Works as an . Does not smoke. Rare alcohol. No drug history or risk factors for HIV.     FAMILY HISTORY: Father  at 85 of congestive heart failure. Mother is alive at 83. No siblings. Two sons, who are healthy.   at age 38 of ruptured aneurysm. No FH of breast, ovarian, or colon cancer.     Review of Systems   Musculoskeletal: Positive for arthralgias and back pain.      A comprehensive 14 point review of systems was performed and was negative except as mentioned.    Current Outpatient Prescriptions on File Prior to Visit   Medication Sig Dispense Refill   • anastrozole (ARIMIDEX) 1 MG tablet Take 1 tablet by mouth Daily. 90 tablet 3   • aspirin 81 MG tablet Take 81 mg by mouth daily.     • baclofen (LIORESAL) 10 MG tablet Take 1 tablet by mouth 3 (Three) Times a Day. 30 tablet 0   • Calcium Citrate (CITRACAL PO) Take  by mouth.     • hydrochlorothiazide (HYDRODIURIL) 50 MG tablet Take 50 mg by mouth daily.     • losartan (COZAAR) 100 MG  "tablet Take 100 mg by mouth daily.     • lovastatin (MEVACOR) 20 MG tablet Take 20 mg by mouth every night.     • meloxicam (MOBIC) 15 MG tablet Take 15 mg by mouth As Needed.     • metFORMIN XR (GLUCOPHAGE-XR) 500 MG 24 hr tablet TK 1 T PO D WF FOR DIABETES  2   • Multiple Vitamins-Minerals (PRESERVISION AREDS 2) capsule Take  by mouth.     • multivitamin-iron-minerals-folic acid (CENTRUM) chewable tablet Chew 1 tablet daily.     • risedronate (ACTONEL) 150 MG tablet TAKE 1 TABLET MONTHLY 3 tablet 3     No current facility-administered medications on file prior to visit.          ALLERGIES:    Allergies   Allergen Reactions   • Augmentin [Amoxicillin-Pot Clavulanate] Diarrhea   • Cefdinir Diarrhea   • Compazine [Prochlorperazine Edisylate]    • Lisinopril Unknown (See Comments)     Other reaction(s): Cough   • Prochlorperazine Unknown (See Comments)   • Sulfa Antibiotics Diarrhea       Objective      Vitals:    10/08/18 1248   BP: 131/72   Pulse: 65   Resp: 12   Temp: 98.7 °F (37.1 °C)   TempSrc: Oral   SpO2: 95%   Weight: 69.4 kg (153 lb)   Height: 142 cm (55.91\")  Comment: new ht   PainSc: 0-No pain     Current Status 10/8/2018   ECOG score 0       Physical Exam      GENERAL:  Well-developed, well-nourished in no acute distress.   SKIN:  Warm, dry without rashes, purpura or petechiae.  HEAD:  Normocephalic.  EYES:  Pupils equal, round and reactive to light.  EOMs intact.  Conjunctivae normal.  EARS:  Hearing intact.  NOSE:  Septum midline.  No excoriations or nasal discharge.  MOUTH:  Tongue is well-papillated; no stomatitis or ulcers.  Lips normal.  THROAT:  Oropharynx without lesions or exudates.  NECK:  Supple with good range of motion; no thyromegaly or masses, no JVD.  LYMPHATICS:  No cervical, supraclavicular, axillary or inguinal adenopathy.  CHEST:  Lungs clear to percussion and auscultation. Good airflow.  BREASTS: Right breast shows the persistent nodularity the 10 o'clock position which is stable       "               Left breast is benign with a lumpectomy scar in the upper inner quadrant  CARDIAC:  Regular rate and rhythm without murmurs, rubs or gallops. Normal S1,S2.  ABDOMEN:  Soft, nontender with no organomegaly or masses.  EXTREMITIES:  No clubbing, cyanosis or edema.  NEUROLOGICAL:  Cranial Nerves II-XII grossly intact.  No focal neurological deficits.  PSYCHIATRIC:  Normal affect and mood.  No percussion tenderness over the spine      RECENT LABS:  Hematology WBC   Date Value Ref Range Status   10/08/2018 4.30 (L) 4.50 - 10.70 10*3/mm3 Final     RBC   Date Value Ref Range Status   10/08/2018 4.39 3.90 - 5.20 10*6/mm3 Final     Hemoglobin   Date Value Ref Range Status   10/08/2018 13.3 11.9 - 15.5 g/dL Final     Hematocrit   Date Value Ref Range Status   10/08/2018 38.7 35.6 - 45.5 % Final     Platelets   Date Value Ref Range Status   10/08/2018 224 140 - 500 10*3/mm3 Final                  Assessment/Plan   1.T1 CN 0 ER/IN positive left breast cancer on Arimidex for 6 years with good tolerance Oncotype score of 29--Arimidex continued beyond 5 years  2.  Mild of moderate osteopenia improving in the spine on Actonel  3.  Right breast nodularity stable    Plan   1. return in 1 year for follow-up and continue Arimidex for now  2.  DEXA scan the end of January            10/8/2018      CC:

## 2018-12-17 RX ORDER — RISEDRONATE SODIUM 150 MG/1
TABLET, FILM COATED ORAL
Qty: 3 TABLET | Refills: 3 | Status: SHIPPED | OUTPATIENT
Start: 2018-12-17 | End: 2019-12-05 | Stop reason: SDUPTHER

## 2019-03-21 ENCOUNTER — TELEPHONE (OUTPATIENT)
Dept: ONCOLOGY | Facility: HOSPITAL | Age: 64
End: 2019-03-21

## 2019-03-21 RX ORDER — ANASTROZOLE 1 MG/1
1 TABLET ORAL DAILY
Qty: 90 TABLET | Refills: 3 | Status: SHIPPED | OUTPATIENT
Start: 2019-03-21 | End: 2020-06-08

## 2019-03-21 RX ORDER — ANASTROZOLE 1 MG/1
1 TABLET ORAL DAILY
Qty: 30 TABLET | Refills: 0 | Status: SHIPPED | OUTPATIENT
Start: 2019-03-21 | End: 2019-04-20

## 2019-03-21 NOTE — TELEPHONE ENCOUNTER
----- Message from Raul Velasquez sent at 3/21/2019 10:05 AM EDT -----  Contact: [phone]  Trying to get meds filled

## 2019-03-21 NOTE — TELEPHONE ENCOUNTER
Pt called stating she is out of arimidex and has not heard from [facility] that refill has been approved. Informed pt we did receive fax from [facility] this morning for refill. Arimidex 1mg daily #90 escribed to [facility] and 30 day supply arimidex escribed to [facility] since she is currently out of medication. Pt informed and v/u.

## 2019-06-27 ENCOUNTER — HOSPITAL ENCOUNTER (OUTPATIENT)
Dept: BONE DENSITY | Facility: HOSPITAL | Age: 64
Discharge: HOME | End: 2019-06-27
Admitting: INTERNAL MEDICINE

## 2019-06-27 DIAGNOSIS — M85.859 OSTEOPENIA OF HIP, UNSPECIFIED LATERALITY: ICD-10-CM

## 2019-06-27 DIAGNOSIS — Z17.0 MALIGNANT NEOPLASM OF UPPER-OUTER QUADRANT OF LEFT BREAST IN FEMALE, ESTROGEN RECEPTOR POSITIVE (HCC): ICD-10-CM

## 2019-06-27 DIAGNOSIS — C50.412 MALIGNANT NEOPLASM OF UPPER-OUTER QUADRANT OF LEFT BREAST IN FEMALE, ESTROGEN RECEPTOR POSITIVE (HCC): ICD-10-CM

## 2019-06-27 PROCEDURE — 77080 DXA BONE DENSITY AXIAL: CPT

## 2019-08-14 ENCOUNTER — TRANSCRIBE ORDERS (OUTPATIENT)
Dept: ADMINISTRATIVE | Facility: HOSPITAL | Age: 64
End: 2019-08-14

## 2019-08-14 DIAGNOSIS — Z12.31 SCREENING MAMMOGRAM, ENCOUNTER FOR: Primary | ICD-10-CM

## 2019-09-23 ENCOUNTER — HOSPITAL ENCOUNTER (OUTPATIENT)
Dept: MAMMOGRAPHY | Facility: HOSPITAL | Age: 64
Discharge: HOME | End: 2019-09-23
Admitting: INTERNAL MEDICINE

## 2019-09-23 DIAGNOSIS — Z12.31 SCREENING MAMMOGRAM, ENCOUNTER FOR: ICD-10-CM

## 2019-09-23 PROCEDURE — 77067 SCR MAMMO BI INCL CAD: CPT

## 2019-09-23 PROCEDURE — 77063 BREAST TOMOSYNTHESIS BI: CPT

## 2019-11-22 NOTE — PROGRESS NOTES
Subjective      REASONS FOR FOLLOWUP:   1. T1cN0, ER positive, NV negative, HER/2 negative breast cancer, high intermediate OncoType score(29), receiving Taxotere/Cytoxan x4 followed by aromatase inhibitor.   2. Followup visit for right arm (antecubital area, Cytoxan) infiltration after receiving Taxotere on 02/09/2012.   3. Arimidex started on 04/09/2012.   4. Carpal tunnel syndrome.   5. Persistent mass in right breast at 10 o'clock - evaluation by Dr. Fitzgerald planned.   6. Persistent mass of right breast at 10 o'clock. Dr. Fitzgerald feels this is benign.   7. Worsening bone density, Actonel resumed in 03/2015.       History of Present Illness  patient is 60-year-old lady with a T1 CN 0 ER/NV positive left breast cancer diagnosed in 2012 and treated with 4 cycles of Cytoxan and Taxotere followed by radiation and Arimidex now for 8 years.  She comes in for routine follow-up and is doing very well overall.  Because of her high risk Oncotype we have decided to continue her therapy for 10 years with Arimidex .  She remains on Actonel    She had a viral infection and was having abdominal pains and had a CT the abdomen that showed fibroids and her uterus and she is followed up closely gynecologist was imaged it at least 3 times and feels that it is stable   -mammogram was normal 2 months ago and a DEXA scan in June shows improvement in her hip but with mild worsening in her lumbar spine which has moderate osteopenia    After discussion because of her high Oncotype we will continue therapy for 10 years because her uterus is still in place she does not want to switch to tamoxifen for the remaining 2 years and we will continue current therapy    Active Ambulatory Problems     Diagnosis Date Noted   • Malignant neoplasm of upper-outer quadrant of left female breast (CMS/HCC) 04/18/2016   • Osteopenia 04/18/2016     Resolved Ambulatory Problems     Diagnosis Date Noted   • Bilateral carpal tunnel syndrome 04/18/2016      Past Medical History:   Diagnosis Date   • Arthritis    • Breast cancer (CMS/HCC)    • Carpal tunnel syndrome    • Diabetes mellitus (CMS/HCC)    • Drug therapy    • History of chemotherapy 2011   • History of radiation therapy 2011   • Hyperlipidemia    • Hypertension    • Maintenance chemotherapy    • Uterine fibroid      PAST SURGICAL HISTORY: D and C 1978, 2 childbirths, gallbladder surgery 2007, toenails removed. Previous biopsy on left breast for an abnormality on mammography in 2008 which showed no abnormalities and non-atypical ductal hyperplasia.     OB/GYN HISTORY: G2, P2, AB1 (miscarriage). Menarche age 9. Menopause age 51. She has not been on any hormonal replacement. Age 23 with her first childbirth and did not breast feed.      ONCOLOGIC HISTORY: The patient was noted on routine mammography this year to have a change in the left breast which was not seen in 2011. The findings revealed a 9-10 mm abnormality in the left breast. Ultrasound showed this to be hypoechoic and slight l y irregular, measuring 1.3 cm in greatest dimension. A new biopsy was done on 10/24/11 which showed an invasive ductal carcinoma focally with lobular features, well-differentiated grade 1, ER positive, ID negative, Her-2 negative. The patient then was r e ferred to Dr. Jose R Crystal and had an MRI of both breasts. The right was normal. The left breast showed post biopsy hematoma cavity. Along with the cavity there was a nodular irregular focus of enhancement measuring 1.2 x 0.9 cm. The patient then under w ent lumpectomy sentinel biopsy on 11/21/11 with the findings of a residual 1 cm invasive ductal carcinoma grade 1 out of 3, with no lobular features noted. Margins were widely clear and 2 sentinel nodes were negative for metastasis. The patient is refer red her for decision about further adjuvant treatment.   OncoType DX shows a score of 29 which gives her 20% chance of distant recurrence of 10 years. We discussed options  and I told her based on the fact that the Nayla-RX study had actually used a cutof f of 25 for intermediate risk, I felt that she would definitely qualify for some additional adjuvant treatment in the form of chemotherapy with four doses of Cytoxan/Taxotere and she is agreeable. She has had chemo education and we are going to try to do this without a port and she wants to start on Thursday so she can get done before March when she has a cruise planned. She did have a cardiac echo done which showed EF of 55-60% and no other abnormalities.   Patient seen 12 for cycle 3 of Cytoxan/Taxotere with Neulasta support doing well. Plans to go to [place] for one week after her third cycle before her radiation.   The patient was seen on 12 for her last dose of chemo. Her extravasation site is much improved. We plan to get a PICC line p laced above the extravasation site for her last chemo and pull it after treatment, and she is planning to go to [place] in about 2 weeks and we will check her blood counts before she leaves.   The patient returned from [place] in late March, started radiation in early April and Arimidex to start 4/10/12. Bone density will be checked again in the summer of 2012.   The patient was seen on 10/22/12 doing well. Her hands are bothering her and she is scheduled to have carpal tunnel surgery.   The patient was seen 13 doing well. Carpal tunnels were injected with good relief. Due for followup mammogram in April.   Bilateral carpal tunnel surgery done to  with good results. Arimidex continued. Mammogram 2013 with ultrasound negative.   SOCIAL HISTORY: . Works as an . Does not smoke. Rare alcohol. No drug history or risk factors for HIV.     FAMILY HISTORY: Father  at 85 of congestive heart failure. Mother is alive at 83. No siblings. Two sons, who are healthy.   at age 38 of ruptured aneurysm. No FH of breast, ovarian, or colon cancer.      Review of Systems   Musculoskeletal: Positive for arthralgias and back pain.      A comprehensive 14 point review of systems was performed and was negative except as mentioned.    Current Outpatient Medications on File Prior to Visit   Medication Sig Dispense Refill   • amLODIPine (NORVASC) 5 MG tablet      • anastrozole (ARIMIDEX) 1 MG tablet Take 1 tablet by mouth Daily. 90 tablet 3   • aspirin 81 MG tablet Take 81 mg by mouth daily.     • Calcium Citrate (CITRACAL PO) Take  by mouth.     • Fexofenadine HCl (ALLEGRA PO) Take  by mouth As Needed.     • hydrochlorothiazide (HYDRODIURIL) 50 MG tablet Take 50 mg by mouth daily.     • losartan (COZAAR) 100 MG tablet Take 100 mg by mouth daily.     • lovastatin (MEVACOR) 20 MG tablet Take 20 mg by mouth every night.     • metFORMIN XR (GLUCOPHAGE-XR) 500 MG 24 hr tablet TK 1 T PO D WF FOR DIABETES  2   • Multiple Vitamins-Minerals (PRESERVISION AREDS 2) capsule Take  by mouth.     • multivitamin-iron-minerals-folic acid (CENTRUM) chewable tablet Chew 1 tablet daily.     • risedronate (ACTONEL) 150 MG tablet TAKE 1 TABLET MONTHLY 3 tablet 3   • [DISCONTINUED] baclofen (LIORESAL) 10 MG tablet Take 1 tablet by mouth 3 (Three) Times a Day. 30 tablet 0   • [DISCONTINUED] meloxicam (MOBIC) 15 MG tablet Take 15 mg by mouth As Needed.     • [DISCONTINUED] risedronate (ACTONEL) 150 MG tablet TAKE 1 TABLET MONTHLY 3 tablet 3   • [DISCONTINUED] Unable to find 1 each Daily. ORTHO BIOTIC       No current facility-administered medications on file prior to visit.          ALLERGIES:    Allergies   Allergen Reactions   • Cefdinir Diarrhea   • Compazine [Prochlorperazine Edisylate] Delirium   • Prochlorperazine Delirium   • Augmentin [Amoxicillin-Pot Clavulanate] Diarrhea   • Lisinopril Cough     Other reaction(s): Cough   • Sulfa Antibiotics Diarrhea       Objective      Vitals:    11/25/19 1621   BP: 108/72   Pulse: 78   Resp: 16   Temp: 98.3 °F (36.8 °C)   SpO2: 97%   Weight: 71  "kg (156 lb 8 oz)   Height: 140.7 cm (55.4\")  Comment: new ht w/o shoes   PainSc: 0-No pain     Current Status 11/25/2019   ECOG score 0       Physical Exam    GENERAL:  Well-developed, well-nourished in no acute distress.   SKIN:  Warm, dry without rashes, purpura or petechiae.  HEAD:  Normocephalic.  EYES:  Pupils equal, round and reactive to light.  EOMs intact.  Conjunctivae normal.  EARS:  Hearing intact.  NOSE:  Septum midline.  No excoriations or nasal discharge.  MOUTH:  Tongue is well-papillated; no stomatitis or ulcers.  Lips normal.  THROAT:  Oropharynx without lesions or exudates.  NECK:  Supple with good range of motion; no thyromegaly or masses, no JVD.  LYMPHATICS:  No cervical, supraclavicular, axillary or inguinal adenopathy.  CHEST:  Lungs clear to percussion and auscultation. Good airflow.  BREASTS: Right breast shows the persistent nodularity the 10 o'clock position which is stable                     Left breast is benign with a lumpectomy scar in the upper inner quadrant  CARDIAC:  Regular rate and rhythm without murmurs, rubs or gallops. Normal S1,S2.  ABDOMEN:  Soft, nontender with no organomegaly or masses.  EXTREMITIES:  No clubbing, cyanosis or edema.  NEUROLOGICAL:  Cranial Nerves II-XII grossly intact.  No focal neurological deficits.  PSYCHIATRIC:  Normal affect and mood.  No percussion tenderness over the spine  Physical exam unchanged as of 11/25/2019    RECENT LABS:  Hematology WBC   Date Value Ref Range Status   11/25/2019 4.14 3.40 - 10.80 10*3/mm3 Final   01/25/2018 7.30 4.5 - 11.0 10*3/uL Final     RBC   Date Value Ref Range Status   11/25/2019 4.43 3.77 - 5.28 10*6/mm3 Final   01/25/2018 4.72 4.0 - 5.2 10*6/uL Final     Hemoglobin   Date Value Ref Range Status   11/25/2019 13.2 12.0 - 15.9 g/dL Final   01/25/2018 14.1 12.0 - 16.0 g/dL Final     Hematocrit   Date Value Ref Range Status   11/25/2019 38.9 34.0 - 46.6 % Final   01/25/2018 39.7 36.0 - 46.0 % Final     Platelets   Date " Value Ref Range Status   11/25/2019 219 140 - 450 10*3/mm3 Final   01/25/2018 204 140 - 440 10*3/uL Final                   DEXA scan  FINDINGS: Lumbar T score is  -2.2, representing an 8% interval decrease.        Left hip density is lowest at the  femoral neck where the T score is  0.3, representing an 18% interval increase.       Right hip density is lowest at the  femoral neck where the T score is  -0.2, representing an 11% interval increase.       IMPRESSION:  Osteopenia at the lumbar spine. Mixed interval change in the  density values.     This report was finalized on 6/27/2019       Assessment/Plan   1.T1 CN 0 ER/ND positive left breast cancer on Arimidex for 6 years with good tolerance Oncotype score of 29--Arimidex planned for ~10 years  2.  Mild of moderate osteopenia in the spine on Actonel  3.  Right breast nodularity stable    Plan   1. return in 1 year for follow-up and continue Arimidex and Actonel for now                11/25/2019      CC:

## 2019-11-25 ENCOUNTER — LAB (OUTPATIENT)
Dept: OTHER | Facility: HOSPITAL | Age: 64
End: 2019-11-25

## 2019-11-25 ENCOUNTER — OFFICE VISIT (OUTPATIENT)
Dept: ONCOLOGY | Facility: CLINIC | Age: 64
End: 2019-11-25

## 2019-11-25 VITALS
OXYGEN SATURATION: 97 % | TEMPERATURE: 98.3 F | HEIGHT: 55 IN | HEART RATE: 78 BPM | DIASTOLIC BLOOD PRESSURE: 72 MMHG | BODY MASS INDEX: 36.22 KG/M2 | SYSTOLIC BLOOD PRESSURE: 108 MMHG | RESPIRATION RATE: 16 BRPM | WEIGHT: 156.5 LBS

## 2019-11-25 DIAGNOSIS — C50.412 MALIGNANT NEOPLASM OF UPPER-OUTER QUADRANT OF LEFT BREAST IN FEMALE, ESTROGEN RECEPTOR POSITIVE (HCC): Primary | ICD-10-CM

## 2019-11-25 DIAGNOSIS — C50.412 MALIGNANT NEOPLASM OF UPPER-OUTER QUADRANT OF LEFT FEMALE BREAST, UNSPECIFIED ESTROGEN RECEPTOR STATUS (HCC): Primary | ICD-10-CM

## 2019-11-25 DIAGNOSIS — Z17.0 MALIGNANT NEOPLASM OF UPPER-OUTER QUADRANT OF LEFT BREAST IN FEMALE, ESTROGEN RECEPTOR POSITIVE (HCC): Primary | ICD-10-CM

## 2019-11-25 LAB
ALBUMIN SERPL-MCNC: 4.8 G/DL (ref 3.5–5.2)
ALBUMIN/GLOB SERPL: 1.8 G/DL
ALP SERPL-CCNC: 49 U/L (ref 39–117)
ALT SERPL W P-5'-P-CCNC: 20 U/L (ref 1–33)
ANION GAP SERPL CALCULATED.3IONS-SCNC: 12.3 MMOL/L (ref 5–15)
AST SERPL-CCNC: 17 U/L (ref 1–32)
BASOPHILS # BLD AUTO: 0.05 10*3/MM3 (ref 0–0.2)
BASOPHILS NFR BLD AUTO: 1.2 % (ref 0–1.5)
BILIRUB SERPL-MCNC: 0.2 MG/DL (ref 0.1–1.2)
BUN BLD-MCNC: 19 MG/DL (ref 8–23)
BUN/CREAT SERPL: 30.6 (ref 7–25)
CALCIUM SPEC-SCNC: 10.1 MG/DL (ref 8.6–10.5)
CHLORIDE SERPL-SCNC: 101 MMOL/L (ref 98–107)
CO2 SERPL-SCNC: 29.7 MMOL/L (ref 22–29)
CREAT BLD-MCNC: 0.62 MG/DL (ref 0.57–1)
DEPRECATED RDW RBC AUTO: 39.9 FL (ref 37–54)
EOSINOPHIL # BLD AUTO: 0.13 10*3/MM3 (ref 0–0.4)
EOSINOPHIL NFR BLD AUTO: 3.1 % (ref 0.3–6.2)
ERYTHROCYTE [DISTWIDTH] IN BLOOD BY AUTOMATED COUNT: 12.4 % (ref 12.3–15.4)
GFR SERPL CREATININE-BSD FRML MDRD: 97 ML/MIN/1.73
GLOBULIN UR ELPH-MCNC: 2.6 GM/DL
GLUCOSE BLD-MCNC: 164 MG/DL (ref 65–99)
HCT VFR BLD AUTO: 38.9 % (ref 34–46.6)
HGB BLD-MCNC: 13.2 G/DL (ref 12–15.9)
IMM GRANULOCYTES # BLD AUTO: 0 10*3/MM3 (ref 0–0.05)
IMM GRANULOCYTES NFR BLD AUTO: 0 % (ref 0–0.5)
LYMPHOCYTES # BLD AUTO: 1.25 10*3/MM3 (ref 0.7–3.1)
LYMPHOCYTES NFR BLD AUTO: 30.2 % (ref 19.6–45.3)
MCH RBC QN AUTO: 29.8 PG (ref 26.6–33)
MCHC RBC AUTO-ENTMCNC: 33.9 G/DL (ref 31.5–35.7)
MCV RBC AUTO: 87.8 FL (ref 79–97)
MONOCYTES # BLD AUTO: 0.42 10*3/MM3 (ref 0.1–0.9)
MONOCYTES NFR BLD AUTO: 10.1 % (ref 5–12)
NEUTROPHILS # BLD AUTO: 2.29 10*3/MM3 (ref 1.7–7)
NEUTROPHILS NFR BLD AUTO: 55.4 % (ref 42.7–76)
NRBC BLD AUTO-RTO: 0 /100 WBC (ref 0–0.2)
PLATELET # BLD AUTO: 219 10*3/MM3 (ref 140–450)
PMV BLD AUTO: 9.3 FL (ref 6–12)
POTASSIUM BLD-SCNC: 4.1 MMOL/L (ref 3.5–5.2)
PROT SERPL-MCNC: 7.4 G/DL (ref 6–8.5)
RBC # BLD AUTO: 4.43 10*6/MM3 (ref 3.77–5.28)
SODIUM BLD-SCNC: 143 MMOL/L (ref 136–145)
WBC NRBC COR # BLD: 4.14 10*3/MM3 (ref 3.4–10.8)

## 2019-11-25 PROCEDURE — 99214 OFFICE O/P EST MOD 30 MIN: CPT | Performed by: INTERNAL MEDICINE

## 2019-11-25 PROCEDURE — 85025 COMPLETE CBC W/AUTO DIFF WBC: CPT | Performed by: INTERNAL MEDICINE

## 2019-11-25 PROCEDURE — 80053 COMPREHEN METABOLIC PANEL: CPT | Performed by: INTERNAL MEDICINE

## 2019-11-25 PROCEDURE — 36415 COLL VENOUS BLD VENIPUNCTURE: CPT

## 2019-11-25 RX ORDER — AMLODIPINE BESYLATE 5 MG/1
TABLET ORAL
COMMUNITY
Start: 2019-08-22

## 2019-12-05 RX ORDER — RISEDRONATE SODIUM 150 MG/1
150 TABLET, FILM COATED ORAL
Qty: 3 TABLET | Refills: 3 | Status: SHIPPED | OUTPATIENT
Start: 2019-12-05 | End: 2020-09-10 | Stop reason: SDUPTHER

## 2020-06-08 RX ORDER — ANASTROZOLE 1 MG/1
TABLET ORAL
Qty: 90 TABLET | Refills: 1 | Status: SHIPPED | OUTPATIENT
Start: 2020-06-08 | End: 2020-12-07

## 2020-08-14 ENCOUNTER — TRANSCRIBE ORDERS (OUTPATIENT)
Dept: ADMINISTRATIVE | Facility: HOSPITAL | Age: 65
End: 2020-08-14

## 2020-08-14 DIAGNOSIS — Z12.31 SCREENING MAMMOGRAM, ENCOUNTER FOR: Primary | ICD-10-CM

## 2020-09-10 ENCOUNTER — TELEPHONE (OUTPATIENT)
Dept: ONCOLOGY | Facility: CLINIC | Age: 65
End: 2020-09-10

## 2020-09-10 RX ORDER — RISEDRONATE SODIUM 150 MG/1
150 TABLET, FILM COATED ORAL
Qty: 3 TABLET | Refills: 3 | Status: SHIPPED | OUTPATIENT
Start: 2020-09-10 | End: 2020-09-22 | Stop reason: ALTCHOICE

## 2020-09-10 NOTE — TELEPHONE ENCOUNTER
Caller: [facility]    Relationship to patient: PHARMACIST    Best call back number: [phone]    Concerns or Questions if Applicable:  PHARMACY IS UNABLE TO GET THRU TO [facility] TO TRANSFER PT'S MEDS. THEY'VE TRIED REACHING THEM FOR A WEEK AND NEED A NEW SCRIPT SENT TO THEM    Prescription below should go to   [facility]  [phone] [place] [id number]    risedronate (ACTONEL) 150 MG tablet

## 2020-09-11 ENCOUNTER — TELEPHONE (OUTPATIENT)
Dept: ONCOLOGY | Facility: CLINIC | Age: 65
End: 2020-09-11

## 2020-09-11 NOTE — TELEPHONE ENCOUNTER
Patient's wanting to know if she could have medication  Fosamax instead of risedronate (ACTONEL) 150 MG tablet due to cost of the medication    Patient's concerned that there may be an insurance issue if her yearly follow up is a a couple of days shy of a year    Her phone# is [phone]

## 2020-09-17 ENCOUNTER — TELEPHONE (OUTPATIENT)
Dept: ONCOLOGY | Facility: CLINIC | Age: 65
End: 2020-09-17

## 2020-09-17 NOTE — TELEPHONE ENCOUNTER
Patient calling to check status on if she could have medication Fosamax instead of risedronate (ACTONEL) 150 MG tablet due to cost of the medication. Said originally requested if this would be okay on 9/11/20 and has not heard back from anyone. Would like update.     Callback# [phone]

## 2020-09-17 NOTE — TELEPHONE ENCOUNTER
INFORMED PT. SETH LANDERS IS WORKING ON A PA FOR THE FOSAMAX.  WILL INFORM DR. THOMAS THAT THIS WILL BE LESS EXPENSIVE FOR PT.  WILL GET BACK WITH PT. AS SOON AS WE GET APPROVAL.  V/U.

## 2020-09-17 NOTE — TELEPHONE ENCOUNTER
DR. THOMAS  IS FINE THAT SETH WORK ON A PA FOR PT. TO RECEIVE FOSAMAX INSTEAD OF ACTONEL DUE TO THE COST.  WILL RELAY THIS TO SETH LANDERS.

## 2020-09-22 ENCOUNTER — TELEPHONE (OUTPATIENT)
Dept: ONCOLOGY | Facility: CLINIC | Age: 65
End: 2020-09-22

## 2020-09-22 RX ORDER — ALENDRONATE SODIUM 70 MG/1
70 TABLET ORAL
Qty: 4 TABLET | Refills: 12 | Status: SHIPPED | OUTPATIENT
Start: 2020-09-22 | End: 2021-09-17

## 2020-09-22 NOTE — TELEPHONE ENCOUNTER
----- Message from Cristina Hawkins sent at 2020  1:33 PM EDT -----  Regarding: RE: BILLIE Hinojosa, would you mind sending that rx to her pharmacy then I can just call them when I get the approval.  ----- Message -----  From: Heena Vaughn RN  Sent: 2020  12:54 PM EDT  To: Cristina Hawkins  Subject: BILLIE MCGHEE-[birth year]  KAMALJIT ANN.  DR. THOMAS IS OK WITH PT. CHANGING TO FOSAMAX.  THANKS!!!!

## 2020-09-22 NOTE — TELEPHONE ENCOUNTER
Pt. Informed dr. Prado is ok with her switching over to the fosamax 70mg once a week.  Pt. Has taken it in the past.  Per Cristina,  Will [facility]scribe to [facility] in [place] so she can work on the pa.  Pt. Will start on a otc PPI  to help with epigastric discomfort.  Is aware to not lie down after taking medications.  Will make sure she reads the insert when she picks up the medication.  To call for any problems.  V/u.

## 2020-11-03 ENCOUNTER — HOSPITAL ENCOUNTER (OUTPATIENT)
Dept: MAMMOGRAPHY | Facility: HOSPITAL | Age: 65
Discharge: HOME | End: 2020-11-03
Admitting: INTERNAL MEDICINE

## 2020-11-03 DIAGNOSIS — Z12.31 SCREENING MAMMOGRAM, ENCOUNTER FOR: ICD-10-CM

## 2020-11-03 PROCEDURE — 77063 BREAST TOMOSYNTHESIS BI: CPT

## 2020-11-03 PROCEDURE — 77067 SCR MAMMO BI INCL CAD: CPT

## 2020-11-23 ENCOUNTER — APPOINTMENT (OUTPATIENT)
Dept: OTHER | Facility: HOSPITAL | Age: 65
End: 2020-11-23

## 2020-11-30 ENCOUNTER — LAB (OUTPATIENT)
Dept: OTHER | Facility: HOSPITAL | Age: 65
End: 2020-11-30

## 2020-11-30 ENCOUNTER — APPOINTMENT (OUTPATIENT)
Dept: OTHER | Facility: HOSPITAL | Age: 65
End: 2020-11-30

## 2020-11-30 DIAGNOSIS — C50.412 MALIGNANT NEOPLASM OF UPPER-OUTER QUADRANT OF LEFT FEMALE BREAST, UNSPECIFIED ESTROGEN RECEPTOR STATUS (HCC): Primary | ICD-10-CM

## 2020-11-30 LAB
ALBUMIN SERPL-MCNC: 4.6 G/DL (ref 3.5–5.2)
ALBUMIN/GLOB SERPL: 1.8 G/DL
ALP SERPL-CCNC: 52 U/L (ref 39–117)
ALT SERPL W P-5'-P-CCNC: 20 U/L (ref 1–33)
ANION GAP SERPL CALCULATED.3IONS-SCNC: 10.1 MMOL/L (ref 5–15)
AST SERPL-CCNC: 19 U/L (ref 1–32)
BASOPHILS # BLD AUTO: 0.06 10*3/MM3 (ref 0–0.2)
BASOPHILS NFR BLD AUTO: 1.3 % (ref 0–1.5)
BILIRUB SERPL-MCNC: 0.2 MG/DL (ref 0–1.2)
BUN SERPL-MCNC: 24 MG/DL (ref 8–23)
BUN/CREAT SERPL: 30 (ref 7–25)
CALCIUM SPEC-SCNC: 10.1 MG/DL (ref 8.6–10.5)
CHLORIDE SERPL-SCNC: 100 MMOL/L (ref 98–107)
CO2 SERPL-SCNC: 31.9 MMOL/L (ref 22–29)
CREAT SERPL-MCNC: 0.8 MG/DL (ref 0.57–1)
DEPRECATED RDW RBC AUTO: 40.2 FL (ref 37–54)
EOSINOPHIL # BLD AUTO: 0.16 10*3/MM3 (ref 0–0.4)
EOSINOPHIL NFR BLD AUTO: 3.5 % (ref 0.3–6.2)
ERYTHROCYTE [DISTWIDTH] IN BLOOD BY AUTOMATED COUNT: 12.5 % (ref 12.3–15.4)
GFR SERPL CREATININE-BSD FRML MDRD: 72 ML/MIN/1.73
GLOBULIN UR ELPH-MCNC: 2.5 GM/DL
GLUCOSE SERPL-MCNC: 182 MG/DL (ref 65–99)
HCT VFR BLD AUTO: 40.7 % (ref 34–46.6)
HGB BLD-MCNC: 13.8 G/DL (ref 12–15.9)
IMM GRANULOCYTES # BLD AUTO: 0.02 10*3/MM3 (ref 0–0.05)
IMM GRANULOCYTES NFR BLD AUTO: 0.4 % (ref 0–0.5)
LYMPHOCYTES # BLD AUTO: 1.23 10*3/MM3 (ref 0.7–3.1)
LYMPHOCYTES NFR BLD AUTO: 26.7 % (ref 19.6–45.3)
MCH RBC QN AUTO: 29.9 PG (ref 26.6–33)
MCHC RBC AUTO-ENTMCNC: 33.9 G/DL (ref 31.5–35.7)
MCV RBC AUTO: 88.1 FL (ref 79–97)
MONOCYTES # BLD AUTO: 0.4 10*3/MM3 (ref 0.1–0.9)
MONOCYTES NFR BLD AUTO: 8.7 % (ref 5–12)
NEUTROPHILS NFR BLD AUTO: 2.74 10*3/MM3 (ref 1.7–7)
NEUTROPHILS NFR BLD AUTO: 59.4 % (ref 42.7–76)
NRBC BLD AUTO-RTO: 0 /100 WBC (ref 0–0.2)
PLATELET # BLD AUTO: 234 10*3/MM3 (ref 140–450)
PMV BLD AUTO: 9.7 FL (ref 6–12)
POTASSIUM SERPL-SCNC: 3.7 MMOL/L (ref 3.5–5.2)
PROT SERPL-MCNC: 7.1 G/DL (ref 6–8.5)
RBC # BLD AUTO: 4.62 10*6/MM3 (ref 3.77–5.28)
SODIUM SERPL-SCNC: 142 MMOL/L (ref 136–145)
WBC # BLD AUTO: 4.61 10*3/MM3 (ref 3.4–10.8)

## 2020-11-30 PROCEDURE — 80053 COMPREHEN METABOLIC PANEL: CPT | Performed by: INTERNAL MEDICINE

## 2020-11-30 PROCEDURE — 85025 COMPLETE CBC W/AUTO DIFF WBC: CPT | Performed by: INTERNAL MEDICINE

## 2020-11-30 PROCEDURE — 36415 COLL VENOUS BLD VENIPUNCTURE: CPT

## 2020-12-04 ENCOUNTER — OFFICE VISIT (OUTPATIENT)
Dept: ONCOLOGY | Facility: CLINIC | Age: 65
End: 2020-12-04

## 2020-12-04 DIAGNOSIS — Z17.0 MALIGNANT NEOPLASM OF UPPER-OUTER QUADRANT OF LEFT BREAST IN FEMALE, ESTROGEN RECEPTOR POSITIVE (HCC): Primary | ICD-10-CM

## 2020-12-04 DIAGNOSIS — Z79.811 LONG TERM (CURRENT) USE OF AROMATASE INHIBITORS: ICD-10-CM

## 2020-12-04 DIAGNOSIS — Z78.0 POST-MENOPAUSAL: ICD-10-CM

## 2020-12-04 DIAGNOSIS — C50.412 MALIGNANT NEOPLASM OF UPPER-OUTER QUADRANT OF LEFT BREAST IN FEMALE, ESTROGEN RECEPTOR POSITIVE (HCC): Primary | ICD-10-CM

## 2020-12-04 PROCEDURE — 99442 PR PHYS/QHP TELEPHONE EVALUATION 11-20 MIN: CPT | Performed by: INTERNAL MEDICINE

## 2020-12-04 NOTE — PROGRESS NOTES
Subjective      REASONS FOR FOLLOWUP:   1. T1cN0, ER positive, ID negative, HER/2 negative left breast cancer, high intermediate OncoType score(29), receiving Taxotere/Cytoxan x4 followed by aromatase inhibitor.   2. Followup visit for right arm (antecubital area, Cytoxan) infiltration after receiving Taxotere on 02/09/2012.   3. Arimidex started on 04/09/2012.   4. Carpal tunnel syndrome.   5. Persistent mass in right breast at 10 o'clock - evaluation by Dr. Fitzgerald planned.   6. Persistent mass of right breast at 10 o'clock. Dr. Fitzgerald feels this is benign.   7. Worsening bone density, Actonel resumed in 03/2015.       History of Present Illness  patient is 60-year-old lady with a T1 CN 0 ER/ID positive left breast cancer diagnosed in 2012 and treated with 4 cycles of Cytoxan and Taxotere followed by radiation and Arimidex now for 9 years.     She is doing a video visit today because of the coronavirus pandemi.she is doing very well overall.  Because of her high risk Oncotype we have decided to continue her therapy for 10 years with Arimidex .  She remains on Actonel    Her mammogram was finally scheduled in November because of the coronavirus pandemic and was thankfully benign.  She is due for bone density next June    She reports she is going to an exercise class with weights and developed some pain in the right lower right rib cage which is the opposite side from her original breast cancer.  She stopped exercising and the pain got better she started back and the pain recurred and I suspect this is musculoskeletal and not malignant.  I told her to back off for 2 months without any weights and if the pain persists to call me and we will do a bone scan    After discussion because of her high Oncotype we will continue therapy for 10 years because her uterus is still in place she does not want to switch to tamoxifen for the remaining 2 years and we will continue current therapy    Active Ambulatory Problems      Diagnosis Date Noted   • Malignant neoplasm of upper-outer quadrant of left female breast (CMS/HCC) 04/18/2016   • Osteopenia 04/18/2016     Resolved Ambulatory Problems     Diagnosis Date Noted   • Bilateral carpal tunnel syndrome 04/18/2016     Past Medical History:   Diagnosis Date   • Arthritis    • Breast cancer (CMS/HCC)    • Carpal tunnel syndrome    • Diabetes mellitus (CMS/HCC)    • Drug therapy    • History of chemotherapy 2011   • History of radiation therapy 2011   • Hyperlipidemia    • Hypertension    • Maintenance chemotherapy    • Uterine fibroid      PAST SURGICAL HISTORY: D and C 1978, 2 childbirths, gallbladder surgery 2007, toenails removed. Previous biopsy on left breast for an abnormality on mammography in 2008 which showed no abnormalities and non-atypical ductal hyperplasia.     OB/GYN HISTORY: G2, P2, AB1 (miscarriage). Menarche age 9. Menopause age 51. She has not been on any hormonal replacement. Age 23 with her first childbirth and did not breast feed.      ONCOLOGIC HISTORY: The patient was noted on routine mammography this year to have a change in the left breast which was not seen in 2011. The findings revealed a 9-10 mm abnormality in the left breast. Ultrasound showed this to be hypoechoic and slight l y irregular, measuring 1.3 cm in greatest dimension. A new biopsy was done on 10/24/11 which showed an invasive ductal carcinoma focally with lobular features, well-differentiated grade 1, ER positive, WI negative, Her-2 negative. The patient then was r e ferred to Dr. Jose R Crystal and had an MRI of both breasts. The right was normal. The left breast showed post biopsy hematoma cavity. Along with the cavity there was a nodular irregular focus of enhancement measuring 1.2 x 0.9 cm. The patient then under w ent lumpectomy sentinel biopsy on 11/21/11 with the findings of a residual 1 cm invasive ductal carcinoma grade 1 out of 3, with no lobular features noted. Margins were widely clear  and 2 sentinel nodes were negative for metastasis. The patient is refer red her for decision about further adjuvant treatment.   OncoType DX shows a score of 29 which gives her 20% chance of distant recurrence of 10 years. We discussed options and I told her based on the fact that the Nayla-RX study had actually used a cutof f of 25 for intermediate risk, I felt that she would definitely qualify for some additional adjuvant treatment in the form of chemotherapy with four doses of Cytoxan/Taxotere and she is agreeable. She has had chemo education and we are going to try to do this without a port and she wants to start on Thursday so she can get done before March when she has a cruise planned. She did have a cardiac echo done which showed EF of 55-60% and no other abnormalities.   Patient seen 2/9/12 for cycle 3 of Cytoxan/Taxotere with Neulasta support doing well. Plans to go to [place] for one week after her third cycle before her radiation.   The patient was seen on 02/29/12 for her last dose of chemo. Her extravasation site is much improved. We plan to get a PICC line p laced above the extravasation site for her last chemo and pull it after treatment, and she is planning to go to [place] in about 2 weeks and we will check her blood counts before she leaves.   The patient returned from [place] in late March, started radiation in early April and Arimidex to start 4/10/12. Bone density will be checked again in the summer of 2012.   The patient was seen on 10/22/12 doing well. Her hands are bothering her and she is scheduled to have carpal tunnel surgery.   The patient was seen 02/27/13 doing well. Carpal tunnels were injected with good relief. Due for followup mammogram in April.   Bilateral carpal tunnel surgery done to 2013 with good results. Arimidex continued. Mammogram August 2013 with ultrasound negative.     2019  She had a viral infection and was having abdominal pains and had a CT the abdomen that showed  fibroids and her uterus and she is followed up closely gynecologist was imaged it at least 3 times and feels that it is stable    SOCIAL HISTORY: . Works as an . Does not smoke. Rare alcohol. No drug history or risk factors for HIV.     FAMILY HISTORY: Father  at 85 of congestive heart failure. Mother is alive at 83. No siblings. Two sons, who are healthy.   at age 38 of ruptured aneurysm. No FH of breast, ovarian, or colon cancer.     Review of Systems   Musculoskeletal: Positive for arthralgias (Right rib cage for 2 months). Negative for back pain.      A comprehensive 14 point review of systems was performed and was negative except as mentioned.    Current Outpatient Medications on File Prior to Visit   Medication Sig Dispense Refill   • alendronate (Fosamax) 70 MG tablet Take 1 tablet by mouth Every 7 (Seven) Days. 4 tablet 12   • amLODIPine (NORVASC) 5 MG tablet      • anastrozole (ARIMIDEX) 1 MG tablet TAKE ONE TABLET BY MOUTH DAILY 90 tablet 1   • aspirin 81 MG tablet Take 81 mg by mouth daily.     • Calcium Citrate (CITRACAL PO) Take  by mouth.     • Fexofenadine HCl (ALLEGRA PO) Take  by mouth As Needed.     • hydrochlorothiazide (HYDRODIURIL) 50 MG tablet Take 50 mg by mouth daily.     • losartan (COZAAR) 100 MG tablet Take 100 mg by mouth daily.     • lovastatin (MEVACOR) 20 MG tablet Take 20 mg by mouth every night.     • metFORMIN XR (GLUCOPHAGE-XR) 500 MG 24 hr tablet TK 1 T PO D WF FOR DIABETES  2   • Multiple Vitamins-Minerals (PRESERVISION AREDS 2) capsule Take  by mouth.     • multivitamin-iron-minerals-folic acid (CENTRUM) chewable tablet Chew 1 tablet daily.       No current facility-administered medications on file prior to visit.          ALLERGIES:    Allergies   Allergen Reactions   • Cefdinir Diarrhea   • Compazine [Prochlorperazine Edisylate] Delirium   • Prochlorperazine Delirium   • Augmentin [Amoxicillin-Pot Clavulanate] Diarrhea   • Lisinopril  Cough     Other reaction(s): Cough   • Sulfa Antibiotics Diarrhea       Objective      There were no vitals filed for this visit.  Current Status 11/25/2019   ECOG score 0       Physical Exam    GENERAL:  Well-developed, well-nourished in no acute distress.   SKIN:  Warm, dry without rashes, purpura or petechiae.  HEAD:  Normocephalic.  EYES:  Pupils equal, round and reactive to light.  EOMs intact.  Conjunctivae normal.  EARS:  Hearing intact.  NOSE:  Septum midline.  No excoriations or nasal discharge.  MOUTH:  Tongue is well-papillated; no stomatitis or ulcers.  Lips normal.  THROAT:  Oropharynx without lesions or exudates.  NECK:  Supple with good range of motion; no thyromegaly or masses, no JVD.  LYMPHATICS:  No cervical, supraclavicular, axillary or inguinal adenopathy.  CHEST:  Lungs clear to percussion and auscultation. Good airflow.  BREASTS: Right breast shows the persistent nodularity the 10 o'clock position which is stable                     Left breast is benign with a lumpectomy scar in the upper inner quadrant  CARDIAC:  Regular rate and rhythm without murmurs, rubs or gallops. Normal S1,S2.  ABDOMEN:  Soft, nontender with no organomegaly or masses.  EXTREMITIES:  No clubbing, cyanosis or edema.  NEUROLOGICAL:  Cranial Nerves II-XII grossly intact.  No focal neurological deficits.  PSYCHIATRIC:  Normal affect and mood.  No percussion tenderness over the spine  Telephone visit no physical    RECENT LABS:  Hematology WBC   Date Value Ref Range Status   11/30/2020 4.61 3.40 - 10.80 10*3/mm3 Final   01/25/2018 7.30 4.5 - 11.0 10*3/uL Final     RBC   Date Value Ref Range Status   11/30/2020 4.62 3.77 - 5.28 10*6/mm3 Final   01/25/2018 4.72 4.0 - 5.2 10*6/uL Final     Hemoglobin   Date Value Ref Range Status   11/30/2020 13.8 12.0 - 15.9 g/dL Final   01/25/2018 14.1 12.0 - 16.0 g/dL Final     Hematocrit   Date Value Ref Range Status   11/30/2020 40.7 34.0 - 46.6 % Final   01/25/2018 39.7 36.0 - 46.0 %  Final     Platelets   Date Value Ref Range Status   11/30/2020 234 140 - 450 10*3/mm3 Final   01/25/2018 204 140 - 440 10*3/uL Final                   DEXA scan  FINDINGS: Lumbar T score is  -2.2, representing an 8% interval decrease.        Left hip density is lowest at the  femoral neck where the T score is  0.3, representing an 18% interval increase.       Right hip density is lowest at the  femoral neck where the T score is  -0.2, representing an 11% interval increase.       IMPRESSION:  Osteopenia at the lumbar spine. Mixed interval change in the  density values.     This report was finalized on 6/27/2019 09/23/2019  EXAMINATION: Bilateral digital mammography with R2 computer aided  detection and bilateral digital Tomosynthesis      FINDINGS: Bilateral digital CC and MLO mammographic and digital  Tomosynthesis images were obtained. Comparison is made to prior studies  dated 09/17/2018, 9/15/2017 and 9/14/2016 . The parenchyma of both  breasts is largely fatty-replaced. I see no new or dominant masses,  areas of nonpostsurgical architectural distortion or skin thickening.  Stable benign postsurgical architectural distortion is noted in the left  breast. There is no evidence for axillary lymphadenopathy or nipple  retraction.     IMPRESSION:  1. There is no evidence for malignancy or significant change in either  breast. Routine followup mammography is recommended.     BI-RADS category 2: Benign.       11/03/2020  MAMMO SCREENING DIGITAL TOMOSYNTHESIS BILATERAL W CAD-   COMPARISON: Prior examinations dating back to 10/15/2012.   FINDINGS:  The breasts are almost entirely fatty.     There are benign-appearing post surgical changes in the left breast.  There are benign-appearing calcifications. There are no suspicious  masses, calcifications, or areas of architectural distortion.     IMPRESSION/RECOMMENDATION(S):  No mammographic evidence of malignancy. Recommend annual screening  mammogram in one year.      BI-RADS Category 2: Benign             Assessment/Plan   1.T1 CN 0 ER/MN positive left breast cancer on Arimidex for 6 years with good tolerance Oncotype score of 29--Arimidex planned for ~10 years  2.  Mild of moderate osteopenia in the spine on Actonel  3.  Right breast nodularity stable    Plan   1. return in 1 year for follow-up and continue Arimidex till then and Actonel also    2.  DEXA scan in July and mammogram in November 2021    She knows to call me if her rib pain does not improve and we will do a bone scan    You have chosen to receive care through a telephone visit. Do you consent to use a telephone visit for your medical care today? Yes  Telephone visit 15 minutes         12/4/2020      CC:

## 2020-12-07 ENCOUNTER — TRANSCRIBE ORDERS (OUTPATIENT)
Dept: ADMINISTRATIVE | Facility: HOSPITAL | Age: 65
End: 2020-12-07

## 2020-12-07 DIAGNOSIS — Z12.31 SCREENING MAMMOGRAM, ENCOUNTER FOR: Primary | ICD-10-CM

## 2020-12-07 RX ORDER — ANASTROZOLE 1 MG/1
TABLET ORAL
Qty: 90 TABLET | Refills: 3 | Status: SHIPPED | OUTPATIENT
Start: 2020-12-07 | End: 2021-11-29

## 2021-03-16 ENCOUNTER — BULK ORDERING (OUTPATIENT)
Dept: CASE MANAGEMENT | Facility: OTHER | Age: 66
End: 2021-03-16

## 2021-03-16 DIAGNOSIS — Z23 IMMUNIZATION DUE: ICD-10-CM

## 2021-06-29 ENCOUNTER — HOSPITAL ENCOUNTER (OUTPATIENT)
Dept: BONE DENSITY | Facility: HOSPITAL | Age: 66
Discharge: HOME | End: 2021-06-29
Admitting: INTERNAL MEDICINE

## 2021-06-29 DIAGNOSIS — C50.412 MALIGNANT NEOPLASM OF UPPER-OUTER QUADRANT OF LEFT BREAST IN FEMALE, ESTROGEN RECEPTOR POSITIVE (HCC): ICD-10-CM

## 2021-06-29 DIAGNOSIS — Z79.811 LONG TERM (CURRENT) USE OF AROMATASE INHIBITORS: ICD-10-CM

## 2021-06-29 DIAGNOSIS — Z17.0 MALIGNANT NEOPLASM OF UPPER-OUTER QUADRANT OF LEFT BREAST IN FEMALE, ESTROGEN RECEPTOR POSITIVE (HCC): ICD-10-CM

## 2021-06-29 DIAGNOSIS — Z78.0 POST-MENOPAUSAL: ICD-10-CM

## 2021-06-29 PROCEDURE — 77080 DXA BONE DENSITY AXIAL: CPT

## 2021-09-17 RX ORDER — ALENDRONATE SODIUM 70 MG/1
TABLET ORAL
Qty: 4 TABLET | Refills: 12 | Status: SHIPPED | OUTPATIENT
Start: 2021-09-17

## 2021-09-17 NOTE — TELEPHONE ENCOUNTER
Fosamax refill request rec electronically from [facility]. Per last office note on 12/4/2020 -Dr Prado states pt will continue Actonel until seen in a year. Pt was switched to Fosamax due to cost of Actonel.    I have routed the rx to Katlyn CHAPARRO NP as Dr Prado is out of the office. Once signed it will be escribed to [facility].

## 2021-11-05 ENCOUNTER — HOSPITAL ENCOUNTER (OUTPATIENT)
Dept: MAMMOGRAPHY | Facility: HOSPITAL | Age: 66
Discharge: HOME | End: 2021-11-05
Admitting: INTERNAL MEDICINE

## 2021-11-05 DIAGNOSIS — Z12.31 SCREENING MAMMOGRAM, ENCOUNTER FOR: ICD-10-CM

## 2021-11-05 PROCEDURE — 77063 BREAST TOMOSYNTHESIS BI: CPT

## 2021-11-05 PROCEDURE — 77067 SCR MAMMO BI INCL CAD: CPT

## 2021-11-29 ENCOUNTER — TELEPHONE (OUTPATIENT)
Dept: ONCOLOGY | Facility: CLINIC | Age: 66
End: 2021-11-29

## 2021-11-29 RX ORDER — ANASTROZOLE 1 MG/1
TABLET ORAL
Qty: 90 TABLET | Refills: 3 | Status: SHIPPED | OUTPATIENT
Start: 2021-11-29

## 2021-11-29 NOTE — TELEPHONE ENCOUNTER
Caller: Trupti Davis    Relationship: Self    Best call back number: [phone]    What is the best time to reach you: ANYTIME    Who are you requesting to speak with (clinical staff, provider,  specific staff member): CLINICAL      What was the call regarding: PATIENT CALLED STATED SHE IS MOVING TO [place] AND HAS TO CHANGE INSURANCE, THEY WILL NOT LET HER APPLY UNLESS SHE HAS BEEN OFF THE ANASTROZOLE 1 MG FOR 1 YEAR. COULD SHE COME OFF OF IT NOW SO THAT SHE COULD APPLY IN A YEAR FOR HER INSURANCE.    Do you require a callback: YES PLEASE CALL TO ADVISE ASAP BECAUSE DEADLINE FOR OPEN ENROLLMENT IS December 7TH

## 2021-11-30 ENCOUNTER — TELEPHONE (OUTPATIENT)
Dept: ONCOLOGY | Facility: CLINIC | Age: 66
End: 2021-11-30

## 2021-11-30 NOTE — TELEPHONE ENCOUNTER
Reviewed situation with pt's insurance with Dr. Prado. Pt has been on her arimidex for 9 1/2 years. Okay to stop at this time so that she is able to obtain insurance coverage when she relocates to [place]. Pt v/u.

## 2021-12-06 ENCOUNTER — LAB (OUTPATIENT)
Dept: OTHER | Facility: HOSPITAL | Age: 66
End: 2021-12-06

## 2021-12-06 ENCOUNTER — OFFICE VISIT (OUTPATIENT)
Dept: ONCOLOGY | Facility: CLINIC | Age: 66
End: 2021-12-06

## 2021-12-06 VITALS
HEIGHT: 56 IN | DIASTOLIC BLOOD PRESSURE: 85 MMHG | BODY MASS INDEX: 32.01 KG/M2 | WEIGHT: 142.3 LBS | TEMPERATURE: 97.8 F | OXYGEN SATURATION: 95 % | SYSTOLIC BLOOD PRESSURE: 136 MMHG | RESPIRATION RATE: 16 BRPM | HEART RATE: 77 BPM

## 2021-12-06 DIAGNOSIS — C50.412 MALIGNANT NEOPLASM OF UPPER-OUTER QUADRANT OF LEFT FEMALE BREAST, UNSPECIFIED ESTROGEN RECEPTOR STATUS (HCC): Primary | ICD-10-CM

## 2021-12-06 DIAGNOSIS — Z17.1 MALIGNANT NEOPLASM OF UPPER-OUTER QUADRANT OF LEFT BREAST IN FEMALE, ESTROGEN RECEPTOR NEGATIVE (HCC): Primary | ICD-10-CM

## 2021-12-06 DIAGNOSIS — Z79.811 AROMATASE INHIBITOR USE: ICD-10-CM

## 2021-12-06 DIAGNOSIS — C50.412 MALIGNANT NEOPLASM OF UPPER-OUTER QUADRANT OF LEFT BREAST IN FEMALE, ESTROGEN RECEPTOR NEGATIVE (HCC): Primary | ICD-10-CM

## 2021-12-06 LAB
ALBUMIN SERPL-MCNC: 4.8 G/DL (ref 3.5–5.2)
ALBUMIN/GLOB SERPL: 1.8 G/DL
ALP SERPL-CCNC: 52 U/L (ref 39–117)
ALT SERPL W P-5'-P-CCNC: 18 U/L (ref 1–33)
ANION GAP SERPL CALCULATED.3IONS-SCNC: 10.4 MMOL/L (ref 5–15)
AST SERPL-CCNC: 17 U/L (ref 1–32)
BASOPHILS # BLD AUTO: 0.05 10*3/MM3 (ref 0–0.2)
BASOPHILS NFR BLD AUTO: 1.4 % (ref 0–1.5)
BILIRUB SERPL-MCNC: 0.4 MG/DL (ref 0–1.2)
BUN SERPL-MCNC: 21 MG/DL (ref 8–23)
BUN/CREAT SERPL: 31.8 (ref 7–25)
CALCIUM SPEC-SCNC: 9.7 MG/DL (ref 8.6–10.5)
CHLORIDE SERPL-SCNC: 101 MMOL/L (ref 98–107)
CO2 SERPL-SCNC: 29.6 MMOL/L (ref 22–29)
CREAT SERPL-MCNC: 0.66 MG/DL (ref 0.57–1)
DEPRECATED RDW RBC AUTO: 41.1 FL (ref 37–54)
EOSINOPHIL # BLD AUTO: 0.14 10*3/MM3 (ref 0–0.4)
EOSINOPHIL NFR BLD AUTO: 3.8 % (ref 0.3–6.2)
ERYTHROCYTE [DISTWIDTH] IN BLOOD BY AUTOMATED COUNT: 12.7 % (ref 12.3–15.4)
GFR SERPL CREATININE-BSD FRML MDRD: 89 ML/MIN/1.73
GLOBULIN UR ELPH-MCNC: 2.6 GM/DL
GLUCOSE SERPL-MCNC: 202 MG/DL (ref 65–99)
HCT VFR BLD AUTO: 40.8 % (ref 34–46.6)
HGB BLD-MCNC: 13.5 G/DL (ref 12–15.9)
IMM GRANULOCYTES # BLD AUTO: 0.01 10*3/MM3 (ref 0–0.05)
IMM GRANULOCYTES NFR BLD AUTO: 0.3 % (ref 0–0.5)
LYMPHOCYTES # BLD AUTO: 1.04 10*3/MM3 (ref 0.7–3.1)
LYMPHOCYTES NFR BLD AUTO: 28.1 % (ref 19.6–45.3)
MCH RBC QN AUTO: 29.3 PG (ref 26.6–33)
MCHC RBC AUTO-ENTMCNC: 33.1 G/DL (ref 31.5–35.7)
MCV RBC AUTO: 88.5 FL (ref 79–97)
MONOCYTES # BLD AUTO: 0.4 10*3/MM3 (ref 0.1–0.9)
MONOCYTES NFR BLD AUTO: 10.8 % (ref 5–12)
NEUTROPHILS NFR BLD AUTO: 2.06 10*3/MM3 (ref 1.7–7)
NEUTROPHILS NFR BLD AUTO: 55.6 % (ref 42.7–76)
NRBC BLD AUTO-RTO: 0 /100 WBC (ref 0–0.2)
PLATELET # BLD AUTO: 248 10*3/MM3 (ref 140–450)
PMV BLD AUTO: 9.6 FL (ref 6–12)
POTASSIUM SERPL-SCNC: 3.9 MMOL/L (ref 3.5–5.2)
PROT SERPL-MCNC: 7.4 G/DL (ref 6–8.5)
RBC # BLD AUTO: 4.61 10*6/MM3 (ref 3.77–5.28)
SODIUM SERPL-SCNC: 141 MMOL/L (ref 136–145)
WBC NRBC COR # BLD: 3.7 10*3/MM3 (ref 3.4–10.8)

## 2021-12-06 PROCEDURE — 99213 OFFICE O/P EST LOW 20 MIN: CPT | Performed by: INTERNAL MEDICINE

## 2021-12-06 PROCEDURE — 85025 COMPLETE CBC W/AUTO DIFF WBC: CPT | Performed by: INTERNAL MEDICINE

## 2021-12-06 PROCEDURE — 80053 COMPREHEN METABOLIC PANEL: CPT | Performed by: INTERNAL MEDICINE

## 2021-12-06 PROCEDURE — 36415 COLL VENOUS BLD VENIPUNCTURE: CPT

## 2021-12-06 NOTE — PROGRESS NOTES
Subjective      REASONS FOR FOLLOWUP:   1. T1cN0, ER positive, MI negative, HER/2 negative left breast cancer, high intermediate OncoType score(29), receiving Taxotere/Cytoxan x4 followed by aromatase inhibitor. 4/12  2. Followup visit for right arm (antecubital area, Cytoxan) infiltration after receiving Taxotere on 02/09/2012.   3. Arimidex started on 04/09/2012.   4. Carpal tunnel syndrome.   5. Persistent mass in right breast at 10 o'clock - evaluation by Dr. Fitzgerald planned.   6. Persistent mass of right breast at 10 o'clock. Dr. Fitzgerald feels this is benign.   7. Worsening bone density, Actonel resumed in 03/2015.       History of Present Illness  patient is 60-year-old lady with a T1 CN 0 ER/MI positive left breast cancer diagnosed in 2012 and treated with 4 cycles of Cytoxan and Taxotere followed by radiation and Arimidex now for almost 10 years.     He is moving to [place] to be closer to family and has to change insurances and they are giving her a hard time about the Arimidex and I think she can stop at this point as she will be 8 years in April    I am also recommended she stop her Boniva    Her mammogram was finally scheduled in November 2021 because of the coronavirus pandemic and was thankfully benign.  Bone density shows slow improvement    I recommended she follow-up with her colonoscopy and with her mammograms until age 78     Active Ambulatory Problems     Diagnosis Date Noted   • Malignant neoplasm of upper-outer quadrant of left female breast (HCC) 04/18/2016   • Osteopenia 04/18/2016     Resolved Ambulatory Problems     Diagnosis Date Noted   • Bilateral carpal tunnel syndrome 04/18/2016     Past Medical History:   Diagnosis Date   • Arthritis    • Breast cancer (HCC)    • Carpal tunnel syndrome    • Diabetes mellitus (HCC)    • Drug therapy    • History of chemotherapy 2011   • History of radiation therapy 2011   • Hyperlipidemia    • Hypertension    • Maintenance chemotherapy    •  Uterine fibroid      PAST SURGICAL HISTORY: D and C 1978, 2 childbirths, gallbladder surgery 2007, toenails removed. Previous biopsy on left breast for an abnormality on mammography in 2008 which showed no abnormalities and non-atypical ductal hyperplasia.     OB/GYN HISTORY: G2, P2, AB1 (miscarriage). Menarche age 9. Menopause age 51. She has not been on any hormonal replacement. Age 23 with her first childbirth and did not breast feed.      ONCOLOGIC HISTORY: The patient was noted on routine mammography this year to have a change in the left breast which was not seen in 2011. The findings revealed a 9-10 mm abnormality in the left breast. Ultrasound showed this to be hypoechoic and slight l y irregular, measuring 1.3 cm in greatest dimension. A new biopsy was done on 10/24/11 which showed an invasive ductal carcinoma focally with lobular features, well-differentiated grade 1, ER positive, MO negative, Her-2 negative. The patient then was r e ferred to Dr. Jose R Crystal and had an MRI of both breasts. The right was normal. The left breast showed post biopsy hematoma cavity. Along with the cavity there was a nodular irregular focus of enhancement measuring 1.2 x 0.9 cm. The patient then under w ent lumpectomy sentinel biopsy on 11/21/11 with the findings of a residual 1 cm invasive ductal carcinoma grade 1 out of 3, with no lobular features noted. Margins were widely clear and 2 sentinel nodes were negative for metastasis. The patient is refer red her for decision about further adjuvant treatment.   OncoType DX shows a score of 29 which gives her 20% chance of distant recurrence of 10 years. We discussed options and I told her based on the fact that the Nayla-RX study had actually used a cutof f of 25 for intermediate risk, I felt that she would definitely qualify for some additional adjuvant treatment in the form of chemotherapy with four doses of Cytoxan/Taxotere and she is agreeable. She has had chemo education  and we are going to try to do this without a port and she wants to start on Thursday so she can get done before March when she has a cruise planned. She did have a cardiac echo done which showed EF of 55-60% and no other abnormalities.   Patient seen 12 for cycle 3 of Cytoxan/Taxotere with Neulasta support doing well. Plans to go to [place] for one week after her third cycle before her radiation.   The patient was seen on 12 for her last dose of chemo. Her extravasation site is much improved. We plan to get a PICC line p laced above the extravasation site for her last chemo and pull it after treatment, and she is planning to go to [place] in about 2 weeks and we will check her blood counts before she leaves.   The patient returned from [place] in late March, started radiation in early April and Arimidex to start 4/10/12. Bone density will be checked again in the summer of .   The patient was seen on 10/22/12 doing well. Her hands are bothering her and she is scheduled to have carpal tunnel surgery.   The patient was seen 13 doing well. Carpal tunnels were injected with good relief. Due for followup mammogram in April.   Bilateral carpal tunnel surgery done to  with good results. Arimidex continued. Mammogram 2013 with ultrasound negative.     2019  She had a viral infection and was having abdominal pains and had a CT the abdomen that showed fibroids and her uterus and she is followed up closely gynecologist was imaged it at least 3 times and feels that it is stable    SOCIAL HISTORY: . Works as an . Does not smoke. Rare alcohol. No drug history or risk factors for HIV.     FAMILY HISTORY: Father  at 85 of congestive heart failure. Mother is alive at 83. No siblings. Two sons, who are healthy.   at age 38 of ruptured aneurysm. No FH of breast, ovarian, or colon cancer.     Review of Systems   Musculoskeletal: Positive for arthralgias (Right rib cage  "for 2 months). Negative for back pain.      A comprehensive 14 point review of systems was performed and was negative except as mentioned.    Current Outpatient Medications on File Prior to Visit   Medication Sig Dispense Refill   • alendronate (FOSAMAX) 70 MG tablet TAKE 1 TABLET BY MOUTH ONCE WEEKLY ON AN EMPTY STOMACH BEFORE BREAKFAST. REMAIN UPRIGHT FOR 30 MINUTES & TAKE WITH 8 OUNCES OF WATER 4 tablet 12   • amLODIPine (NORVASC) 5 MG tablet      • anastrozole (ARIMIDEX) 1 MG tablet TAKE ONE TABLET BY MOUTH DAILY 90 tablet 3   • aspirin 81 MG tablet Take 81 mg by mouth daily.     • Calcium Citrate (CITRACAL PO) Take  by mouth.     • Fexofenadine HCl (ALLEGRA PO) Take  by mouth As Needed.     • hydrochlorothiazide (HYDRODIURIL) 50 MG tablet Take 50 mg by mouth daily.     • losartan (COZAAR) 100 MG tablet Take 100 mg by mouth daily.     • metFORMIN XR (GLUCOPHAGE-XR) 500 MG 24 hr tablet Three times a day  2   • Multiple Vitamins-Minerals (PRESERVISION AREDS 2) capsule Take  by mouth.     • multivitamin-iron-minerals-folic acid (CENTRUM) chewable tablet Chew 1 tablet daily.     • lovastatin (MEVACOR) 20 MG tablet Take 20 mg by mouth every night.       No current facility-administered medications on file prior to visit.         ALLERGIES:    Allergies   Allergen Reactions   • Cefdinir Diarrhea   • Compazine [Prochlorperazine Edisylate] Delirium   • Prochlorperazine Delirium   • Augmentin [Amoxicillin-Pot Clavulanate] Diarrhea   • Lisinopril Cough     Other reaction(s): Cough   • Sulfa Antibiotics Diarrhea       Objective      Vitals:    12/06/21 1111   BP: 136/85   Pulse: 77   Resp: 16   Temp: 97.8 °F (36.6 °C)   TempSrc: Temporal   SpO2: 95%   Weight: 64.5 kg (142 lb 4.8 oz)   Height: 142.2 cm (56\")   PainSc: 0-No pain     Current Status 11/25/2019   ECOG score 0       Physical Exam    GENERAL:  Well-developed, well-nourished in no acute distress.   SKIN:  Warm, dry without rashes, purpura or petechiae.  HEAD:  " Normocephalic.  EYES:  Pupils equal, round and reactive to light.  EOMs intact.  Conjunctivae normal.  EARS:  Hearing intact.  NOSE:  Septum midline.  No excoriations or nasal discharge.  MOUTH:  Tongue is well-papillated; no stomatitis or ulcers.  Lips normal.  THROAT:  Oropharynx without lesions or exudates.  NECK:  Supple with good range of motion; no thyromegaly or masses, no JVD.  LYMPHATICS:  No cervical, supraclavicular, axillary or inguinal adenopathy.  CHEST:  Lungs clear to percussion and auscultation. Good airflow.  BREASTS: Right breast shows the persistent nodularity the 10 o'clock position which is stable                     Left breast is benign with a lumpectomy scar in the upper inner quadrant  CARDIAC:  Regular rate and rhythm without murmurs, rubs or gallops. Normal S1,S2.  ABDOMEN:  Soft, nontender with no organomegaly or masses.  EXTREMITIES:  No clubbing, cyanosis or edema.  NEUROLOGICAL:  Cranial Nerves II-XII grossly intact.  No focal neurological deficits.  PSYCHIATRIC:  Normal affect and mood.  No percussion tenderness over the spine  Telephone visit no physical    RECENT LABS:  Hematology WBC   Date Value Ref Range Status   12/06/2021 3.70 3.40 - 10.80 10*3/mm3 Final   01/25/2018 7.30 4.5 - 11.0 10*3/uL Final     RBC   Date Value Ref Range Status   12/06/2021 4.61 3.77 - 5.28 10*6/mm3 Final   01/25/2018 4.72 4.0 - 5.2 10*6/uL Final     Hemoglobin   Date Value Ref Range Status   12/06/2021 13.5 12.0 - 15.9 g/dL Final   01/25/2018 14.1 12.0 - 16.0 g/dL Final     Hematocrit   Date Value Ref Range Status   12/06/2021 40.8 34.0 - 46.6 % Final   01/25/2018 39.7 36.0 - 46.0 % Final     Platelets   Date Value Ref Range Status   12/06/2021 248 140 - 450 10*3/mm3 Final   01/25/2018 204 140 - 440 10*3/uL Final                   DEXA scan  FINDINGS: Lumbar T score is  -2.2, representing an 8% interval decrease.        Left hip density is lowest at the  femoral neck where the T score is  0.3,  representing an 18% interval increase.       Right hip density is lowest at the  femoral neck where the T score is  -0.2, representing an 11% interval increase.       IMPRESSION:  Osteopenia at the lumbar spine. Mixed interval change in the  density values.     This report was finalized on 6/27/2019 09/23/2019  EXAMINATION: Bilateral digital mammography with R2 computer aided  detection and bilateral digital Tomosynthesis      FINDINGS: Bilateral digital CC and MLO mammographic and digital  Tomosynthesis images were obtained. Comparison is made to prior studies  dated 09/17/2018, 9/15/2017 and 9/14/2016 . The parenchyma of both  breasts is largely fatty-replaced. I see no new or dominant masses,  areas of nonpostsurgical architectural distortion or skin thickening.  Stable benign postsurgical architectural distortion is noted in the left  breast. There is no evidence for axillary lymphadenopathy or nipple  retraction.     IMPRESSION:  1. There is no evidence for malignancy or significant change in either  breast. Routine followup mammography is recommended.     BI-RADS category 2: Benign.       11/03/2020  MAMMO SCREENING DIGITAL TOMOSYNTHESIS BILATERAL W CAD-   COMPARISON: Prior examinations dating back to 10/15/2012.   FINDINGS:  The breasts are almost entirely fatty.     There are benign-appearing post surgical changes in the left breast.  There are benign-appearing calcifications. There are no suspicious  masses, calcifications, or areas of architectural distortion.     IMPRESSION/RECOMMENDATION(S):  No mammographic evidence of malignancy. Recommend annual screening  mammogram in one year.     BI-RADS Category 2: Benign             Assessment/Plan   1.T1 CN 0 ER/HI positive left breast cancer on Arimidex for 6 years with good tolerance Oncotype score of 29--Arimidex planned for ~10 years-due to stop in early 2022  2.  Mild of moderate osteopenia in the spine on Actonel  3.  Right breast nodularity  stable    Plan   1.  No follow-up needed at this time she is moving to [place] and she can stop both her Boniva and her Arimidex      12/6/2021      CC: